# Patient Record
Sex: MALE | Race: WHITE
[De-identification: names, ages, dates, MRNs, and addresses within clinical notes are randomized per-mention and may not be internally consistent; named-entity substitution may affect disease eponyms.]

---

## 2018-03-15 NOTE — EKG
Date Performed: 03/15/2018       Time Performed: 07:33:20

 

PTAGE:      57 years

 

EKG:      Sinus rhythm 

 

 NORMAL ECG 

 

NO PREVIOUS TRACING            

 

DOCTOR:   Misti Albarado  Interpretating Date/Time  03/15/2018 13:53:05

## 2018-03-15 NOTE — HHI.HP
[facility]


Service


Critical Care Medicine


Primary Care Physician


Keenan Tom MD


Admission Diagnosis





Ischemic Colitis


Diagnosis:  


(1) Colitis, acute


Diagnosis:  Principal





(2) Lactic acidosis


Diagnosis:  Secondary





(3) Tobacco abuse


Diagnosis:  Secondary





(4) NELLIE (acute kidney injury)


Diagnosis:  Secondary





(5) Severe sepsis


Diagnosis:  Principal





(6) Opioid dependence


Diagnosis:  Secondary





(7) Chronic pain


Diagnosis:  Secondary





Travel History


International Travel<30 Days:  No


Contact w/Intl Traveler <30 Da:  No


Traveled to Known Affected Are:  No


History of Present Illness


57 year-old  male who states he has had 3 days of left lower quadrant 

abdominal pain.  He has had some nausea and says he has had 3 episodes of 

vomiting, nonbloody nonbilious, over the course of the last 3 days.  He has 

also had 2-3 loose bowel movements over the course of the 3 days.  His friend, 

Janel, tells me that she found him in the house listless in bed with large 

amounts of dark stool on the floor of the bathroom.  He denies fever or chills.

  Workup in the ED demonstrated white count 28,000 with 20% bands.  Lactic acid 

is 6.5.  CT abdomen and pelvis demonstrates colitis  distal transverse colon to 

distal sigmoid, ischemic v.  infectious   He is normotensive with normal heart 

rate.  Patient is tender in the left lower quadrant.  He feels like his pain is 

slightly improved today compared to yesterday.  He states he has been eating 

intermittently throughout the course of this and has not noted increased pain 

with by mouth intake.  Denies history of atrial fibrillation though he is on 

Cardizem.  He attributes his symptoms to opioid withdrawal because he ran out 

of his oxycodone a few days ago and states he has been taking them for over 15 

years





Review of Systems


ROS Limitations:  Clinical Condition





Past Family Social History


Allergies:  


Coded Allergies:  


     codeine (Unverified  Allergy, Severe, 3/15/18)


Past Medical History


HTN


Insomnia


Chronic pain





He denies prior history of atrial fibrillation though he has previously been on 

Cardizem.


Past Surgical History


Right hip arthroplasty 2002


Left hip arthroplasty 2003


Reported Medications


Patient does not know the doses of his medications but states he is on:





Oxycodone


Amitriptyline


Unknown antihypertensive


Family History


Father is living at age 89


Brother had an ocular tumor that was metastatic and  at age 43


Mother  of an unknown cancer in her late 70s


Social History


Homosexual. His partner is  from HIV. Patient states he had HIV test 4 

years ago after partner  and was negative and has subsequently not been 

sexually active. 


No kids. Father is living at age 89. Mother is .


Smokes 1 ppd since 5th grade


Used to binge drink heavily, never a daily drinker, quit drinking in 


No illicit drug use.





Physical Exam


Vital Signs





Vital Signs








  Date Time  Temp Pulse Resp B/P (MAP) Pulse Ox O2 Delivery O2 Flow Rate FiO2


 


3/15/18 04:58     100 Room Air  


 


3/15/18 00:11   18     


 


3/15/18 00:09 97.6 110 18 131/74 (93) 98   








Physical Exam


GENERAL: Well-nourished, well-developed patient who is sitting up in ED bed, 

alert pleasant


SKIN: Warm and dry.


HEAD: Atraumatic. Normocephalic. 


EYES: Pupils equal and round. No scleral icterus. No injection or drainage. 


ENT: No nasal bleeding or discharge.  Mucous membranes pink and moist.


NECK: Trachea midline. No JVD. 


CARDIOVASCULAR: Regular rate and rhythm. No murmurs rubs or gallops. 


RESPIRATORY: No accessory muscle use. Clear to auscultation. Breath sounds 

equal bilaterally.  On room air


GASTROINTESTINAL: Abdomen is overall soft, mildly distended, tender in the left 

lower quadrant.  He does indicate there is some rebound discomfort but overall 

does not appear to have Peritoneal signs.


MUSCULOSKELETAL: Extremities without clubbing, cyanosis, or edema.


NEUROLOGICAL: Awake and alert. No obvious cranial nerve deficits.  Motor 

grossly within normal limits. Normal speech.


Laboratory





Laboratory Tests








Test


  3/15/18


00:18 3/15/18


01:31 3/15/18


04:44 3/15/18


04:54


 


White Blood Count 28.6    


 


Red Blood Count 5.26    


 


Hemoglobin 16.3    


 


Hematocrit 49.5    


 


Mean Corpuscular Volume 94.2    


 


Mean Corpuscular Hemoglobin 31.1    


 


Mean Corpuscular Hemoglobin


Concent 33.0 


  


  


  


 


 


Red Cell Distribution Width 14.1    


 


Platelet Count 226    


 


Mean Platelet Volume 8.5    


 


Neutrophils (%) (Auto) 84.8    


 


Lymphocytes (%) (Auto) 4.5    


 


Monocytes (%) (Auto) 10.4    


 


Eosinophils (%) (Auto) 0.1    


 


Basophils (%) (Auto) 0.2    


 


Neutrophils # (Auto) 24.2    


 


Lymphocytes # (Auto) 1.3    


 


Monocytes # (Auto) 3.0    


 


Eosinophils # (Auto) 0.0    


 


Basophils # (Auto) 0.1    


 


CBC Comment AUTO DIFF    


 


Differential Total Cells


Counted 100 


  


  


  


 


 


Neutrophils % (Manual) 49    


 


Band Neutrophils % 20    


 


Lymphocytes % 8    


 


Monocytes % 11    


 


Neutrophils # (Manual) 23.2    


 


Metamyelocytes 12    


 


Differential Comment


  FINAL DIFF


MANUAL 


  


  


 


 


Atypical Lymphocytes     


 


Toxic Vacuolation PRESENT    


 


Platelet Estimate NORMAL    


 


Platelet Morphology Comment NORMAL    


 


Red Cell Morphology Comment NORMAL    


 


Blood Urea Nitrogen 31    


 


Creatinine 2.15    


 


Random Glucose 102    


 


Total Protein 7.5    


 


Albumin 3.2    


 


Calcium Level 8.9    


 


Alkaline Phosphatase 69    


 


Aspartate Amino Transf


(AST/SGOT) 31 


  


  


  


 


 


Alanine Aminotransferase


(ALT/SGPT) 16 


  


  


  


 


 


Total Bilirubin 0.8    


 


Sodium Level 140    


 


Potassium Level 4.5    


 


Chloride Level 102    


 


Carbon Dioxide Level 23.5    


 


Anion Gap 15    


 


Estimat Glomerular Filtration


Rate 32 


  


  


  


 


 


Lipase 56  54   


 


Lactic Acid Level  6.5   














 Date/Time


Source Procedure


Growth Status


 


 


 3/15/18 02:40


Blood Peripheral Aerobic Blood Culture


Pending Received


 


 3/15/18 02:40


Blood Peripheral Anaerobic Blood Culture


Pending Received








Result Diagram:  


3/15/18 0018                                                                   

             3/15/18 0018








Septic Shock Reassessment


Septic shock perfusion:  reassessment completed





Caprini VTE Risk Assessment


Caprini VTE Risk Assessment:  Mod/High Risk (score >= 2)


VTE Pharm Contraindication:  Documented


Caprini Risk Assessment Model











 Point Value = 1          Point Value = 2  Point Value = 3  Point Value = 5


 


Age 41-60


Minor surgery


BMI > 25 kg/m2


Swollen legs


Varicose veins


Pregnancy or postpartum


History of unexplained or recurrent


   spontaneous 


Oral contraceptives or hormone


   replacement


Sepsis (< 1 month)


Serious lung disease, including


   pneumonia (< 1 month)


Abnormal pulmonary function


Acute myocardial infarction


Congestive heart failure (< 1 month)


History of inflammatory bowel disease


Medical patient at bed rest Age 61-74


Arthroscopic surgery


Major open surgery (> 45 min)


Laparoscopic surgery (> 45 min)


Malignancy


Confined to bed (> 72 hours)


Immobilizing plaster cast


Central venous access Age >= 75


History of VTE


Family history of VTE


Factor V Leiden


Prothrombin 51992D


Lupus anticoagulant


Anticardiolipin antibodies


Elevated serum homocysteine


Heparin-induced thrombocytopenia


Other congenital or acquired


   thrombophilia Stroke (< 1 month)


Elective arthroplasty


Hip, pelvis, or leg fracture


Acute spinal cord injury (< 1 month)








Prophylaxis Regimen











   Total Risk


Factor Score Risk Level Prophylaxis Regimen


 


0-1      Low Early ambulation


 


2 Moderate Order ONE of the following:


*Sequential Compression Device (SCD)


*Heparin 5000 units SQ BID


 


3-4 Higher Order ONE of the following medications:


*Heparin 5000 units SQ TID


*Enoxaparin/Lovenox 40 mg SQ daily (WT < 150 kg, CrCl > 30 mL/min)


*Enoxaparin/Lovenox 30 mg SQ daily (WT < 150 kg, CrCl > 10-29 mL/min)


*Enoxaparin/Lovenox 30 mg SQ BID (WT < 150 kg, CrCl > 30 mL/min)


AND/OR


*Sequential Compression Device (SCD)


 


5 or more Highest Order ONE of the following medications:


*Heparin 5000 units SQ TID (Preferred with Epidurals)


*Enoxaparin/Lovenox 40 mg SQ daily (WT < 150 kg, CrCl > 30 mL/min)


*Enoxaparin/Lovenox 30 mg SQ daily (WT < 150 kg, CrCl > 10-29 mL/min)


*Enoxaparin/Lovenox 30 mg SQ BID (WT < 150 kg, CrCl > 30 mL/min)


AND


*Sequential Compression Device (SCD)











Assessment and Plan


Problem List:  


(1) Severe sepsis


ICD Code:  A41.9 - Sepsis, unspecified organism; R65.20 - Severe sepsis without 

septic shock


Status:  Acute


(2) Colitis, acute


ICD Code:  K52.9 - Noninfective gastroenteritis and colitis, unspecified; 

R65.20 - Severe sepsis without septic shock


Status:  Acute


(3) Lactic acidosis


ICD Code:  E87.2 - Acidosis


Status:  Acute


(4) NELLIE (acute kidney injury)


ICD Code:  N17.9 - Acute kidney failure, unspecified


Status:  Acute


(5) Tobacco abuse


ICD Code:  Z72.0 - Tobacco use


Status:  Chronic


(6) Opioid dependence


ICD Code:  F11.20 - Opioid dependence, uncomplicated


Status:  Chronic


(7) Chronic pain


ICD Code:  G89.29 - Other chronic pain


Status:  Chronic


Assessment and Plan


NEURO:


Chronic pain


Insomnia


Oxycodone as needed for pain.  Dilaudid as needed for breakthrough pain.





RESP:


Tobacco abuse


On room air.  Albuterol every 2 hours as needed for wheezing





CV: 


Check EKG.  Monitor telemetry


Prior medical records states he is on Cardizem.  Unclear patient was still 

taking this.  He states he is on an unknown antihypertensive medication.





GI:


Abdominal pain with ? ischemic colitis


 Vomiting and diarrhea


Nothing by mouth.


Dr. Michelle has discussed with Dr. Blanco who will evaluate patient and 

possible ex lap.


He appears to be clinically improving with resuscitation so may be able to hold 

off for now. GI consulted for input and possible colonoscopy.  Monitor serial 

abdominal exams and lactic acid. 


obtain C diff. 





FEN/RENAL: 


Acute kidney injury


Received 1 L NS in ED.  ml/hr. 


Insert dawson and monitor I/O closely. F/u BMP





ID:


Severe Sepsis 


Colitis - ?Ischemic


Leukocytosis


Cultures have been sent.  We'll send urine culture.  Receive Zosyn and 

vancomycin in the emergency department.  We'll continue vancomycin, cefepime, 

Flagyl, Diflucan.  (dose adjusted for creatinine clearance 28)


Serial lactic acid and serial abdominal exams.  


General surgery and gastroenterology consults





Patient consents to HIV testing, which I have ordered. 





HEME: Monitor CBC





ENDO: Euglycemic





PROPH: SCDs for DVT prophylaxis. Held pharmacologic DVT prophylaxis pending 

surgery/GI eval for possible procedure.  Famotidine IV for stress ulcer 

prophylaxis





ACCESS: Peripheral IV providing adequate access at this time.





Patient updated at bedside.  His friend, Janel Kwong, (patient's male 

partner is  and Janel is his partner's sister) was updated with his 

permission. She encouraged him to come to hospital after he refused several 

times. She can be reached at [phone]. 





Level 3 H&P











Kristi Randle MD Mar 15, 2018 05:13

## 2018-03-15 NOTE — PD.CONS
HPI


History of Present Illness


This is a 57 year old male with chronic back pain who presented for lower abd 

pain, diarrhea, n/v.  ONset 3 days ago.  He denies blood in emesis but per 

another provider he did report this.  He admits red blood in his stool and 

seeing dark stool. He admits taking NSAIDs frequently up until 5 months ago 

when he started noticing epigastric pain.  He admits prior episode of this 1 

year ago and was told by his PCP to get a colonoscopy but he has not done so.  

He thinks he had an EGD in [place] but can recall no further details. 


 (Candice Gustafson)





PFSH


Past Medical History


DDD


neuropathy


HTN


chronic pain


Past Surgical History


bilat hip repalcements


 (Candice Gustafson)


Coded Allergies:  


     codeine (Unverified  Allergy, Severe, 3/15/18)


Family History


ocular tumor - brother


Social History


former drinker quit 2003


smokes 1ppd


denies illicit drug use


 (Candice Gustafson)





Review of Systems


Constitutional:  COMPLAINS OF: Fatigue, DENIES: Fever


Endocrine:  DENIES: Polydipsia


Eyes:  DENIES: Blurred vision


Ears, nose, mouth, throat:  DENIES: Hearing loss


Respiratory:  DENIES: Cough


Cardiovascular:  DENIES: Chest pain


Gastrointestinal:  COMPLAINS OF: Abdominal pain, Black stools, Bloody stools, 

Diarrhea, Nausea, Vomiting


Genitourinary:  DENIES: Hematuria


Musculoskeletal:  DENIES: Joint Swelling


Integumentary:  DENIES: Rash


Hematologic/lymphatic:  DENIES: Bruising


Immunologic/allergic:  DENIES: Eczema


Neurologic:  DENIES: Abnormal gait


Psychiatric:  DENIES: Confusion (Candice Gustafson)





GI Exam


Vitals I&O





Vital Signs








  Date Time  Temp Pulse Resp B/P (MAP) Pulse Ox O2 Delivery O2 Flow Rate FiO2


 


3/15/18 08:10 98.8 99 14 134/86 (102) 93   


 


3/15/18 08:10  99      


 


3/15/18 07:51     (104)    


 


3/15/18 07:35   16     


 


3/15/18 07:35 97.8 98 16 156/79 (104) 98 Room Air  


 


3/15/18 07:35     98 Room Air  


 


3/15/18 07:34   17  98 Room Air  


 


3/15/18 07:03  78 18 148/78 (101) 100   


 


3/15/18 07:00   16     


 


3/15/18 04:58     100 Room Air  


 


3/15/18 00:11   18     


 


3/15/18 00:09 97.6 110 18 131/74 (93) 98   














I/O      


 


 3/14/18 3/14/18 3/14/18 3/15/18 3/15/18 3/15/18





 07:00 15:00 23:00 07:00 15:00 23:00


 


Intake Total    2562.5 ml 100 ml 


 


Output Total     300 ml 


 


Balance    2562.5 ml -200 ml 


 


      


 


Intake IV Total    2562.5 ml 100 ml 


 


Output Urine Total     300 ml 


 


# Voids     0 


 


# Bowel Movements     0 








Imaging





Last Impressions








Abdomen/Pelvis CT 3/15/18 0129 Signed





Impressions: 





 Service Date/Time:  Thursday, March 15, 2018 02:14 - CONCLUSION:  1. Acute 





 inflammatory process extending from the distal transverse colon to the distal 





 sigmoid colon. No significant diverticuli. This could relate to ischemia 

colitis 





 or infectious colitis. 2. No abscess or free air. 3. Well-distended 

gallbladder 





 without calcified gallstones, gallbladder wall thickening, or pericholecystic 





 fluid.     Wil Blue Jr., MD 








Laboratory











Test


  3/15/18


00:18 3/15/18


01:31 3/15/18


04:44 3/15/18


04:54


 


White Blood Count 28.6 TH/MM3    


 


Red Blood Count 5.26 MIL/MM3    


 


Hemoglobin 16.3 GM/DL    


 


Hematocrit 49.5 %    


 


Mean Corpuscular Volume 94.2 FL    


 


Mean Corpuscular Hemoglobin 31.1 PG    


 


Mean Corpuscular Hemoglobin


Concent 33.0 % 


  


  


  


 


 


Red Cell Distribution Width 14.1 %    


 


Platelet Count 226 TH/MM3    


 


Mean Platelet Volume 8.5 FL    


 


Neutrophils (%) (Auto) 84.8 %    


 


Lymphocytes (%) (Auto) 4.5 %    


 


Monocytes (%) (Auto) 10.4 %    


 


Eosinophils (%) (Auto) 0.1 %    


 


Basophils (%) (Auto) 0.2 %    


 


Neutrophils # (Auto) 24.2 TH/MM3    


 


Lymphocytes # (Auto) 1.3 TH/MM3    


 


Monocytes # (Auto) 3.0 TH/MM3    


 


Eosinophils # (Auto) 0.0 TH/MM3    


 


Basophils # (Auto) 0.1 TH/MM3    


 


CBC Comment AUTO DIFF    


 


Differential Total Cells


Counted 100 


  


  


  


 


 


Neutrophils % (Manual) 49 %    


 


Band Neutrophils % 20 %    


 


Lymphocytes % 8 %    


 


Monocytes % 11 %    


 


Neutrophils # (Manual) 23.2 TH/MM3    


 


Metamyelocytes 12 %    


 


Differential Comment


  FINAL DIFF


MANUAL 


  


  


 


 


Atypical Lymphocytes  %    


 


Toxic Vacuolation PRESENT    


 


Platelet Estimate NORMAL    


 


Platelet Morphology Comment NORMAL    


 


Red Cell Morphology Comment NORMAL    


 


Blood Urea Nitrogen 31 MG/DL    


 


Creatinine 2.15 MG/DL    


 


Random Glucose 102 MG/DL    


 


Total Protein 7.5 GM/DL    


 


Albumin 3.2 GM/DL    


 


Calcium Level 8.9 MG/DL    


 


Alkaline Phosphatase 69 U/L    


 


Aspartate Amino Transf


(AST/SGOT) 31 U/L 


  


  


  


 


 


Alanine Aminotransferase


(ALT/SGPT) 16 U/L 


  


  


  


 


 


Total Bilirubin 0.8 MG/DL    


 


Sodium Level 140 MEQ/L    


 


Potassium Level 4.5 MEQ/L    


 


Chloride Level 102 MEQ/L    


 


Carbon Dioxide Level 23.5 MEQ/L    


 


Anion Gap 15 MEQ/L    


 


Estimat Glomerular Filtration


Rate 32 ML/MIN 


  


  


  


 


 


Lipase 56 U/L  54 U/L   


 


Lactic Acid Level  6.5 mmol/L  3.2 mmol/L  


 


Prothrombin Time    12.5 SEC 


 


Prothromb Time International


Ratio 


  


  


  1.2 RATIO 


 


 


Activated Partial


Thromboplast Time 


  


  


  31.0 SEC 


 


 


Test


  3/15/18


06:15 3/15/18


09:32 


  


 


 


Urine Color YELLOW    


 


Urine Turbidity CLEAR    


 


Urine pH 6.0    


 


Urine Specific Gravity 1.020    


 


Urine Protein TRACE mg/dL    


 


Urine Glucose (UA) NEG mg/dL    


 


Urine Ketones NEG mg/dL    


 


Urine Occult Blood NEG    


 


Urine Nitrite NEG    


 


Urine Bilirubin NEG    


 


Urine Urobilinogen 2.0 MG/DL    


 


Urine Leukocyte Esterase TRACE    


 


Urine RBC 3 /hpf    


 


Urine WBC 1 /hpf    


 


Urine Squamous Epithelial


Cells 2 /hpf 


  


  


  


 


 


Urine Bacteria RARE /hpf    


 


Microscopic Urinalysis Comment


  CULT NOT


INDICATED 


  


  


 


 


Blood Urea Nitrogen  31 MG/DL   


 


Creatinine  1.14 MG/DL   


 


Random Glucose  88 MG/DL   


 


Calcium Level  7.7 MG/DL   


 


Sodium Level  141 MEQ/L   


 


Potassium Level  4.5 MEQ/L   


 


Chloride Level  110 MEQ/L   


 


Carbon Dioxide Level  24.3 MEQ/L   


 


Anion Gap  7 MEQ/L   


 


Estimat Glomerular Filtration


Rate 


  66 ML/MIN 


  


  


 


 


Lactic Acid Level  2.3 mmol/L   














 Date/Time


Source Procedure


Growth Status


 


 


 3/15/18 02:40


Blood Peripheral Aerobic Blood Culture


Pending Received


 


 3/15/18 02:40


Blood Peripheral Anaerobic Blood Culture


Pending Received








Physical Examination


HEENT: PERRL; normocephalic; atraumatic; no jaundice.  


CHEST:  CTA


CARDIAC:  tachy


ABDOMEN:  Soft, distended, diffuse TTP; no hepatosplenomegaly; bowel sounds are 

present in all four quadrants.


EXTREMITIES: No clubbing, cyanosis, or edema.


SKIN:  Normal; no rash; no jaundice.


CNS:  alert


 (Candice Gustafson)





Assessment and Plan


Plan


ASSESSMENT


- n/v, abd pain, diarrhea, questionable blood in stool - onset 3 days ago.  

acute colitis seen on CT, 


   infectious vs ischemic. HH WNL at this time.  distended and tender but does 

not appear to have acute abd at this time


   thinks he saw blood in his stool and some "dark" stool.  use to take NSAIDS 

frequently but quit few months ago d/t stomach pain


- leukocytosis - wbc 28.6   on cefepime, flagyl, diflucan





PLAN


- d/w surgery, if pt shows signs worsening infection GS may take to OR 


- clear liquids


- await c diff


- check stool studies


- await hemoccult


- further recs to follow


pt seen by myself and DR Whitley and this note is on his behalf


 (Candice Gustafson)


Physician Comments


As above, features suggestive of ischemic colitis.


Clinically improving , will hold off any endoscopic evaluation and treat 

conservatively.


Will follow up with you.


 (Kristian Whitley MD)











Candice Gustafson Mar 15, 2018 11:06


Kristian Whitley MD Mar 15, 2018 13:19

## 2018-03-15 NOTE — RADRPT
EXAM DATE/TIME:  03/15/2018 02:14 

 

[facility] COMPARISON:     

No previous studies available for comparison.

 

 

INDICATIONS :     

Abdomen pain.

                  

 

ORAL CONTRAST:      

No oral contrast ingested.

                  

 

RADIATION DOSE:     

6.94 CTDIvol (mGy) 

 

 

MEDICAL HISTORY :     

Hypertension.  

 

SURGICAL HISTORY :       

Bilateral hips.

 

ENCOUNTER:      

Initial

 

ACUITY:      

1 day

 

PAIN SCALE:      

5/10

 

LOCATION:       

Bilateral  abdomen

 

TECHNIQUE:     

Volumetric scanning of the abdomen and pelvis was performed.  Using automated exposure control and ad
justment of the mA and/or kV according to patient size, radiation dose was kept as low as reasonably 
achievable to obtain optimal diagnostic quality images.  DICOM format image data is available electro
nically for review and comparison.  

 

FINDINGS:     

 

LOWER LUNGS:     

Emphysematous changes within the visualized lung bases.

 

LIVER:     

Homogeneous density without lesion.  There is no dilation of the biliary tree.  No calcified gallston
es. The gallbladder is well-distended without wall thickening or pericholecystic fluid.

 

SPLEEN:     

Normal size without lesion.

 

PANCREAS:     

Within normal limits. 

 

KIDNEYS:     

Normal in size and shape.  There is no mass, stone, or hydronephrosis.

 

ADRENAL GLANDS:     

Within normal limits.

 

VASCULAR:     

Diffuse calcified atherosclerotic plaque without aneurysmal change.

 

BOWEL/MESENTERY:     

Wall thickening is seen extending from the distal transverse colon to the level of the distal sigmoid
 colon. This is smooth wall thickening. Mild stranding of the adjacent pericolonic fat. Small amount 
of free fluid within the left paracolic gutter. No obstruction. No free air. No abscess. Remaining colton
wel structures are unremarkable.

 

ABDOMINAL WALL:     

Within normal limits.

 

RETROPERITONEUM:     

There is no lymphadenopathy.

 

BLADDER:     

No wall thickening or mass.

 

REPRODUCTIVE:     

Within normal limits.

 

INGUINAL:     

There is no lymphadenopathy or hernia.

 

MUSCULOSKELETAL:     

Within normal limits for patient age. Bilateral hip implants.

 

CONCLUSION:     

1. Acute inflammatory process extending from the distal transverse colon to the distal sigmoid colon.
 No significant diverticuli. This could relate to ischemia colitis or infectious colitis.

2. No abscess or free air.

3. Well-distended gallbladder without calcified gallstones, gallbladder wall thickening, or perichole
cystic fluid.

 

 

 

 Wil Blue Jr., MD on March 15, 2018 at 2:32           

Board Certified Radiologist.

 This report was verified electronically.

## 2018-03-15 NOTE — HHI.CCPN
Subjective


Remarks/Hospital Course


57 year-old  male who states he has had 3 days of left lower quadrant 

abdominal pain.  He has had some nausea and says he has had 3 episodes of 

vomiting, nonbloody nonbilious, over the course of the last 3 days.  He has 

also had 2-3 loose bowel movements over the course of the 3 days.  His friend, 

Janel, tells me that she found him in the house listless in bed with large 

amounts of dark stool on the floor of the bathroom.  He denies fever or chills.

  Workup in the ED demonstrated white count 28,000 with 20% bands.  Lactic acid 

is 6.5.  CT abdomen and pelvis demonstrates colitis  distal transverse colon to 

distal sigmoid, ischemic v.  infectious   He is normotensive with normal heart 

rate.  Patient is tender in the left lower quadrant.  He feels like his pain is 

slightly improved today compared to yesterday.  He states he has been eating 

intermittently throughout the course of this and has not noted increased pain 

with by mouth intake.  Denies history of atrial fibrillation though he is on 

Cardizem.  He attributes his symptoms to opioid withdrawal because he ran out 

of his oxycodone a few days ago and states he has been taking them for over 15 

years





03/15 1800 hours: Urine output finally picking up but remains marginal. Lactic 

acidosis worse. Tenderness persists LLQ. Discussed with Dr. Blanco; probably 

requires exploration and will proceed.





Objective





Vital Signs








  Date Time  Temp Pulse Resp B/P (MAP) Pulse Ox O2 Delivery O2 Flow Rate FiO2


 


3/15/18 16:00  98      


 


3/15/18 12:00 98.6  20 106/70 (82) 97   


 


3/15/18 08:00      Nasal Cannula 2.00 














Intake and Output   


 


 3/15/18 3/15/18 3/16/18





 08:00 16:00 00:00


 


Intake Total 2662.5 ml  


 


Output Total 300 ml  


 


Balance 2362.5 ml  








Result Diagram:  


3/15/18 1158                                                                   

             3/15/18 0932





Objective Remarks


GENERAL: Ill-appearing man.


SKIN: Warm and dry.


HEAD: Atraumatic. Normocephalic. 


EYES: Pupils equal and round. No scleral icterus. No injection or drainage. 


ENT: No nasal bleeding or discharge. Mucous membranes pink and moist.


NECK: Trachea midline. Airway unobstructed.


CARDIOVASCULAR: Regular rate and rhythm. No murmurs rubs or gallops. Neck veins 

flat.


RESPIRATORY:  Clear to auscultation. Breath sounds equal bilaterally. 


GASTROINTESTINAL: Abdomen is overall soft, mildly distended, remains tender in 

the left lower quadrant. Quiet.


MUSCULOSKELETAL: Extremities without clubbing, cyanosis, or edema. Well perfused

, flushed.


NEUROLOGICAL: Confused. No obvious cranial nerve deficits.  Motor grossly 

within normal limits. Follows commands.





A/P


Problem List:  


(1) Severe sepsis


ICD Code:  A41.9 - Sepsis, unspecified organism; R65.20 - Severe sepsis without 

septic shock


Status:  Acute


(2) Colitis, acute


ICD Code:  K52.9 - Noninfective gastroenteritis and colitis, unspecified; 

R65.20 - Severe sepsis without septic shock


Status:  Acute


(3) Lactic acidosis


ICD Code:  E87.2 - Acidosis


Status:  Acute


(4) NELLIE (acute kidney injury)


ICD Code:  N17.9 - Acute kidney failure, unspecified


(5) Tobacco abuse


ICD Code:  Z72.0 - Tobacco use


Status:  Chronic


Assessment and Plan


NEURO:


Chronic pain


Insomnia


Oxycodone as needed for pain.  Dilaudid as needed for breakthrough pain.





RESP:


Tobacco abuse


On room air.  Albuterol every 2 hours as needed for wheezing





CV: 


Check EKG.  Monitor telemetry


Prior medical records states he is on Cardizem.  Unclear patient was still 

taking this.  He states he is on an unknown antihypertensive medication.





GI:


Abdominal pain with ? ischemic colitis


 Vomiting and diarrhea


Nothing by mouth.


Dr. Michelle has discussed with Dr. Blanco who will evaluate patient and 

possible ex lap.


He appears to be clinically improving with resuscitation so may be able to hold 

off for now. GI consulted for input and possible colonoscopy.  Monitor serial 

abdominal exams and lactic acid. 


obtain C diff. .


Plan laparoscopic exploration





FEN/RENAL: 


Acute kidney injury


Received 1 L NS in ED.  ml/hr. 


Maintain dawson and monitor I/O closely. F/u BMP





ID:


Severe Sepsis 


Colitis - ?Ischemic


Leukocytosis


Cultures have been sent.  We'll send urine culture.  Receive Zosyn and 

vancomycin in the emergency department.  We'll continue vancomycin, cefepime, 

Flagyl, Diflucan.  (dose adjusted for creatinine clearance 28)


Serial lactic acid and serial abdominal exams.  


General surgery and gastroenterology consults





Patient consents to HIV testing, which I have ordered. 





HEME: Monitor CBC





ENDO: Euglycemic





PROPH: SCDs for DVT prophylaxis.  Famotidine IV for stress ulcer prophylaxis





ACCESS: Peripheral IV providing adequate access at this time.





Overall impression: Patient has become critically ill with deteriorating 

physical exam and worsening lactic acidosis. Will require surgery.





Critical Care 50 mins











Manuel Calhoun MD Mar 15, 2018 18:14

## 2018-03-15 NOTE — PD
HPI


Chief Complaint:  Abdominal Pain


Time Seen by Provider:  00:38


Travel History


International Travel<30 days:  No


Contact w/Intl Traveler<30days:  No


Traveled to known affect area:  No





History of Present Illness


HPI


79-year-old male arrives here complaining of abdominal pain in the left 

abdomen.  It started in the morning about 12 hours prior to ER arrival.  

Patient reported multiple episodes of vomiting as well as black diarrhea.  He 

reports pain 9/10.  Additional complaints include nausea.  He denies fever.  He 

reports typically takes oxycodone in the morning however didn't today because 

he confronted pill bottle.  He believes there might have been a tiny amount of 

blood in the emesis today.  Pain is constant and worse with palpation.





PFSH


Past Medical History


Hypertension:  Yes


Tetanus Vaccination:  < 5 Years


Influenza Vaccination:  No





Past Surgical History


Appendectomy:  Yes


Joint Replacement:  Yes (BILATERAL HIPS.)


Tonsillectomy:  Yes





Social History


Alcohol Use:  No


Tobacco Use:  Yes (ONE PACK PER DAY)


Substance Use:  No





Allergies-Medications


(Allergen,Severity, Reaction):  


Coded Allergies:  


     codeine (Unverified  Allergy, Severe, 3/15/18)


Reported Meds & Prescriptions





Reported Meds & Active Scripts


Active


Reported


Lyrica (Pregabalin) 75 Mg Cap 75 Mg PO BID


Diltiazem ER 12 HR (Diltiazem HCl) 60 Mg Caper Unknown Dose PO BID


Roxicodone (Oxycodone HCl) 15 Mg Tab 15 Mg PO Q4H PRN








Review of Systems


Except as stated in HPI:  all other systems reviewed are Neg


General / Constitutional:  No: Fever





Physical Exam


Narrative


GENERAL: 57-year-old male well-nourished well-developed mild to moderate 

distress secondary to pain





Vital Signs








  Date Time  Temp Pulse Resp B/P (MAP) Pulse Ox O2 Delivery O2 Flow Rate FiO2


 


3/15/18 00:11   18     


 


3/15/18 00:09 97.6 110 18 131/74 (93) 98   








SKIN: Warm and dry.


HEAD: Atraumatic. Normocephalic. 


EYES: Pupils equal and round. No scleral icterus. No injection or drainage. 


ENT: No nasal bleeding or discharge.  Mucous membranes pink and moist.


NECK: Trachea midline. No JVD. 


CARDIOVASCULAR: Tachycardia.  Regular rhythm.


RESPIRATORY: No accessory muscle use. Clear to auscultation. Breath sounds 

equal bilaterally. 


GASTROINTESTINAL: Generalized abdominal tenderness is present.  There is 

guarding.  Abdomen is peritoneal.


MUSCULOSKELETAL: Extremities without clubbing, cyanosis, or edema. No obvious 

deformities. 


NEUROLOGICAL: Awake and alert. No obvious cranial nerve deficits.  Motor 

grossly within normal limits. Five out of 5 muscle strength in the arms and 

legs.  Normal speech.


PSYCHIATRIC: Appropriate mood and affect; insight and judgment normal.





Data


Data


Last Documented VS





Vital Signs








  Date Time  Temp Pulse Resp B/P (MAP) Pulse Ox O2 Delivery O2 Flow Rate FiO2


 


3/15/18 00:11   18     


 


3/15/18 00:09 97.6 110  131/74 (93) 98   








Orders





 Orders


Complete Blood Count With Diff (3/15/18 00:14)


Comprehensive Metabolic Panel (3/15/18 00:14)


Urinalysis - C+S If Indicated (3/15/18 00:14)


Iv Access Insert/Monitor (3/15/18 00:14)


Oxygen Administration (3/15/18 00:14)


Oximetry (3/15/18 00:14)


Lipase (3/15/18 00:14)


Oxycodone-Acetamin  Mg (Percocet 1 (3/15/18 00:45)


Lipase (3/15/18 01:29)


Lactic Acid (3/15/18 01:29)


Ct Abd/Pel W/O Iv Contrast (3/15/18 01:29)


Ondansetron Inj (Zofran Inj) (3/15/18 01:30)


Sodium Chlor 0.9% 1000 Ml Inj (Ns 1000 M (3/15/18 01:29)


Sodium Chloride 0.9% Flush (Ns Flush) (3/15/18 01:30)


Al-Mag Hy-Si 40-40-4 Mg/Ml Liq (Mag-Al P (3/15/18 01:30)


Lidocaine 2% Viscous (Xylocaine 2% Visco (3/15/18 01:30)


Sepsis Workup Initiated (3/15/18 )


Lactic Acid Sepsis Protocol (3/15/18 03:00)


Blood Culture (3/15/18 03:00)


Vancomycin Inj (Vancomycin Inj) (3/15/18 03:00)


Piperacil-Tazo 2.25 Gm Premix (Zosyn 2.2 (3/15/18 03:00)


Sodium Chlor 0.9% 1000 Ml Inj (Ns 1000 M (3/15/18 03:15)


Hydromorphone Pf Inj (Dilaudid Pf Inj) (3/15/18 03:15)


Admit Order (Ed Use Only) (3/15/18 )


Cardiac Monitor / Telemetry [id number] (3/15/18 04:28)


Vital Signs (Adult) Q4H (3/15/18 04:28)


Diet Npo (3/15/18 Breakfast)


Activity Bed Rest (3/15/18 04:28)





Labs





Laboratory Tests








Test


  3/15/18


00:18 3/15/18


01:31


 


White Blood Count 28.6 TH/MM3  


 


Red Blood Count 5.26 MIL/MM3  


 


Hemoglobin 16.3 GM/DL  


 


Hematocrit 49.5 %  


 


Mean Corpuscular Volume 94.2 FL  


 


Mean Corpuscular Hemoglobin 31.1 PG  


 


Mean Corpuscular Hemoglobin


Concent 33.0 % 


  


 


 


Red Cell Distribution Width 14.1 %  


 


Platelet Count 226 TH/MM3  


 


Mean Platelet Volume 8.5 FL  


 


Neutrophils (%) (Auto) 84.8 %  


 


Lymphocytes (%) (Auto) 4.5 %  


 


Monocytes (%) (Auto) 10.4 %  


 


Eosinophils (%) (Auto) 0.1 %  


 


Basophils (%) (Auto) 0.2 %  


 


Neutrophils # (Auto) 24.2 TH/MM3  


 


Lymphocytes # (Auto) 1.3 TH/MM3  


 


Monocytes # (Auto) 3.0 TH/MM3  


 


Eosinophils # (Auto) 0.0 TH/MM3  


 


Basophils # (Auto) 0.1 TH/MM3  


 


CBC Comment AUTO DIFF  


 


Differential Total Cells


Counted 100 


  


 


 


Neutrophils % (Manual) 49 %  


 


Band Neutrophils % 20 %  


 


Lymphocytes % 8 %  


 


Monocytes % 11 %  


 


Neutrophils # (Manual) 23.2 TH/MM3  


 


Metamyelocytes 12 %  


 


Differential Comment


  FINAL DIFF


MANUAL 


 


 


Atypical Lymphocytes  %  


 


Toxic Vacuolation PRESENT  


 


Platelet Estimate NORMAL  


 


Platelet Morphology Comment NORMAL  


 


Red Cell Morphology Comment NORMAL  


 


Blood Urea Nitrogen 31 MG/DL  


 


Creatinine 2.15 MG/DL  


 


Random Glucose 102 MG/DL  


 


Total Protein 7.5 GM/DL  


 


Albumin 3.2 GM/DL  


 


Calcium Level 8.9 MG/DL  


 


Alkaline Phosphatase 69 U/L  


 


Aspartate Amino Transf


(AST/SGOT) 31 U/L 


  


 


 


Alanine Aminotransferase


(ALT/SGPT) 16 U/L 


  


 


 


Total Bilirubin 0.8 MG/DL  


 


Sodium Level 140 MEQ/L  


 


Potassium Level 4.5 MEQ/L  


 


Chloride Level 102 MEQ/L  


 


Carbon Dioxide Level 23.5 MEQ/L  


 


Anion Gap 15 MEQ/L  


 


Estimat Glomerular Filtration


Rate 32 ML/MIN 


  


 


 


Lipase 56 U/L  54 U/L 


 


Lactic Acid Level  6.5 mmol/L 











MDM


Medical Decision Making


Medical Screen Exam Complete:  Yes


Emergency Medical Condition:  Yes


Medical Record Reviewed:  Yes


Differential Diagnosis


Constipation, Gastritis, Acute Cholecystitis, Biliary Colic, Pancreatitis, ALEXANDRA

, Hepatitis, Bowel Obstruction, Cystitis, Mesenteric Ischemia, AAA, Appendicitis

, Renal Stone/Hydronephrosis, GERD, perforated viscous


Narrative Course


CBC & BMP Diagram


3/15/18 00:18








Total Protein 7.5, Albumin 3.2 L, Calcium Level 8.9, Alkaline Phosphatase 69, 

Aspartate Amino Transf (AST/SGOT) 31, Alanine Aminotransferase (ALT/SGPT) 16, 

Total Bilirubin 0.8





Band neutrophilia 20%


Last Impressions








Abdomen/Pelvis CT 3/15/18 0129 Signed





Impressions: 





 Service Date/Time:  Thursday, March 15, 2018 02:14 - CONCLUSION:  1. Acute 





 inflammatory process extending from the distal transverse colon to the distal 





 sigmoid colon. No significant diverticuli. This could relate to ischemia 

colitis 





 or infectious colitis. 2. No abscess or free air. 3. Well-distended 

gallbladder 





 without calcified gallstones, gallbladder wall thickening, or pericholecystic 





 fluid.     Wil Blue Jr., MD 





Patient received Zosyn and vancomycin.  IV fluid resuscitation initiated.  The 

lactic acid 6.5.   I discussed the case with Dr. Blanco of Gen. surgery who is 

aware of the patient and plans for probable operative intervention.  The heart 

rate is about 100 20/1/10.  The blood pressure is about 130/90.  There is 

concern for potentially ischemic or infectious colitis. d/w Dr Randle for 

intensivist service


Critical Care Narrative


Aggregate critical care time was 35 minutes. Time to perform other separately 

billable procedures was not  


included in the critical care time. My time did not include minutes spent 

treating any other patients simultaneously or on  


activities that did not directly contribute to the patient's treatment.  





The services I provided to this patient were to treat and/or prevent clinically 

significant deterioration that could result  


in: Septic shock, cardiopulmonary arrest





I provided critical care services requiring my management, as noted below:


Chart data review, documentation time, medication orders and management, vital 

sign assessments/reviewing monitor data,  


ordering and reviewing lab tests, ordering and interpreting/reviewing x-rays 

and diagnostic studies, care of the patient  


and discussion of the patient with the admitting physicians.





Sepsis Criteria


SIRS Criteria (2 or more):  RR  > 20 or PaCO2 < 32, WBC > 18277, < 4000 or > 10

% bands


Septic Shock Criteria:  Lactic acid >=4





Diagnosis





 Primary Impression:  


 Colitis, acute


 Additional Impression:  


 Severe sepsis





Admitting Information


Admitting Physician Requests:  Admit











Iván Michelle MD Mar 15, 2018 03:13

## 2018-03-15 NOTE — HHI.PR
__________________________________________________





Immediate Post Op Note


Procedure Date:


Mar 15, 2018


Pre Op Diagnosis:  


acute abdomen, lactic acidosis, colonic ischemia


Post Op Diagnosis:  


acute abdomen, ascities


Surgeon:


Diomedes Blanco MD


Assistant(s):


none


Procedure:


dx lap, with washout, drain placement


Findings:


ascities, viable colon


Complications:


none


Specimen(s) removed:


none


Estimated blood loss:


5cc


Anesthesia:  General


Drains:  ANDREI


Patient to:  PACU


Patient Condition:  Fair











Diomedes Blanco MD Mar 15, 2018 18:20

## 2018-03-16 NOTE — HHI.GIFU
Subjective


Remarks


pt resting in bed.  family at bedside.  Says he is having less pain than 

yesterday and has had no loose stool today.  s/p diag lap yesterday for acute 

abd.


 (Candice Gustafson)





Objective


Vitals I&O





Vital Signs








  Date Time  Temp Pulse Resp B/P (MAP) Pulse Ox O2 Delivery O2 Flow Rate FiO2


 


3/16/18 12:00 99.6 102 13 148/72 (97) 96   


 


3/16/18 12:00  98      


 


3/16/18 10:00  97      


 


3/16/18 08:00  98      


 


3/16/18 08:00 98.4 98 14 161/88 (112) 99   


 


3/16/18 07:00     98 Nasal Cannula 3.00 


 


3/16/18 06:00  108      


 


3/16/18 04:00  100      


 


3/16/18 04:00 98.5 99 16 154/75 (101) 100   


 


3/16/18 02:00  100      


 


3/16/18 00:00 98.3 99 12 143/75 (97) 99   


 


3/16/18 00:00  92      


 


3/15/18 22:20     98 Nasal Cannula 4.00 


 


3/15/18 22:00  105      


 


3/15/18 20:00  91      


 


3/15/18 20:00 98.1 96 14 164/81 (108) 98   


 


3/15/18 19:45 98.1 95 13 140/78 (98) 97   


 


3/15/18 19:30  97 14 137/72 (93) 98 Nasal Cannula 3 


 


3/15/18 19:15  100 14 124/65 (84) 99 Nasal Cannula 3 


 


3/15/18 19:00  102 14 116/61 (79) 99 Nasal Cannula 3 


 


3/15/18 19:00     97 Nasal Cannula 2.00 


 


3/15/18 18:45  104 14 119/62 (81) 98 Nasal Cannula 3 


 


3/15/18 18:39 98.2 107 19 119/60 (79) 96 Nasal Cannula 3 


 


3/15/18 16:00  98      














I/O      


 


 3/15/18 3/15/18 3/15/18 3/16/18 3/16/18 3/16/18





 07:00 15:00 23:00 07:00 15:00 23:00


 


Intake Total 2562.5 ml 100 ml 6000 ml 270 ml  


 


Output Total  300 ml 555 ml 1150 ml  


 


Balance 2562.5 ml -200 ml 5445 ml -880 ml  


 


      


 


Intake Oral    70 ml  


 


IV Total 2562.5 ml 100 ml 6000 ml 200 ml  


 


Output Urine Total  300 ml 450 ml 800 ml  


 


Drainage Total   100 ml 350 ml  


 


Estimated Blood Loss   5 ml   


 


# Voids  0    


 


# Bowel Movements  0  1  








Laboratory





Laboratory Tests








Test


  3/15/18


21:45 3/16/18


03:53


 


Lactic Acid Level 2.2  1.8 


 


White Blood Count  11.5 


 


Red Blood Count  3.96 


 


Hemoglobin  12.5 


 


Hematocrit  37.5 


 


Mean Corpuscular Volume  94.7 


 


Mean Corpuscular Hemoglobin  31.5 


 


Mean Corpuscular Hemoglobin


Concent 


  33.3 


 


 


Red Cell Distribution Width  14.8 


 


Platelet Count  125 


 


Mean Platelet Volume  8.4 


 


CBC Comment  AUTO DIFF 


 


Differential Total Cells


Counted 


  100 


 


 


Neutrophils % (Manual)  46 


 


Band Neutrophils %  28 


 


Lymphocytes %  9 


 


Monocytes %  4 


 


Neutrophils # (Manual)  10.0 


 


Metamyelocytes  13 


 


Differential Comment


  


  FINAL DIFF


MANUAL


 


Toxic Vacuolation  PRESENT 


 


Platelet Estimate  LOW 


 


Platelet Morphology Comment  NORMAL 


 


Prothrombin Time  12.4 


 


Prothromb Time International


Ratio 


  1.2 


 


 


Blood Urea Nitrogen  19 


 


Creatinine  0.93 


 


Random Glucose  98 


 


Total Protein  5.4 


 


Albumin  2.2 


 


Calcium Level  7.2 


 


Phosphorus Level  2.0 


 


Magnesium Level  1.9 


 


Alkaline Phosphatase  41 


 


Aspartate Amino Transf


(AST/SGOT) 


  22 


 


 


Alanine Aminotransferase


(ALT/SGPT) 


  11 


 


 


Total Bilirubin  0.3 


 


Sodium Level  140 


 


Potassium Level  4.8 


 


Chloride Level  108 


 


Carbon Dioxide Level  26.1 


 


Anion Gap  6 


 


Estimat Glomerular Filtration


Rate 


  83 


 


 


Protein Corrected Calcium  8.1 


 


Random Vancomycin Level  4.0 














 Date/Time


Source Procedure


Growth Status


 


 


 3/15/18 02:40


Blood Peripheral Aerobic Blood Culture - Preliminary


NO GROWTH IN 1 DAY Resulted


 


 3/15/18 02:40


Blood Peripheral Anaerobic Blood Culture - Preliminary


NO GROWTH IN 1 DAY Resulted





 3/15/18 12:31


Stool Stool Cryptosporidium Exam


Pending Received


 


 3/15/18 12:31


Stool Stool Giardia Antigen (JODI)


Pending Received








Imaging





Last Impressions








Abdomen/Pelvis CT 3/15/18 0129 Signed





Impressions: 





 Service Date/Time:  Thursday, March 15, 2018 02:14 - CONCLUSION:  1. Acute 





 inflammatory process extending from the distal transverse colon to the distal 





 sigmoid colon. No significant diverticuli. This could relate to ischemia 

colitis 





 or infectious colitis. 2. No abscess or free air. 3. Well-distended 

gallbladder 





 without calcified gallstones, gallbladder wall thickening, or pericholecystic 





 fluid.     Wil Blue Jr., MD 








Physical Exam


HEENT:   normocephalic; atraumatic; no jaundice.  


CHEST:  CTA


CARDIAC:  tachy


ABDOMEN:  Soft, nondistended, mild diffuse TTP; no hepatosplenomegaly; bowel 

sounds are present in all four quadrants.


   brown tinged clear fluid in drain


EXTREMITIES: No clubbing, cyanosis, or edema.


SKIN:  Normal; no rash; no jaundice.


CNS:  lethargic


 (Candice Gustafson)





Assessment and Plan


Plan


ASSESSMENT


- n/v, abd pain, diarrhea, questionable blood in stool - onset 3 days ago.  

acute colitis seen on CT, 


   infectious vs ischemic. HH WNL at this time.  distended and tender but does 

not appear to have acute abd at this time


   thinks he saw blood in his stool and some "dark" stool.  use to take NSAIDS 

frequently but quit few months ago d/t stomach pain


- leukocytosis - wbc 28.6   on cefepime, flagyl, diflucan


3/16/18  s/p diag lap and drain placement yesterday, bowel viable.  pt feels 

better today.  c diff neg.  diarrhea improved today.


  stool studies pending. 





PLAN


- diet per GS


- await stool studies


- further recs to follow


- colonoscopy as outpt


pt seen by myself and DR Whitley and this note is on his behalf


 (Candice Gustafson)


Physician Comments


As above, will check stool studies, F/U clinically.


 (Kristian Whitley MD)











Candice Gustafson Mar 16, 2018 14:39


Kristian Whitley MD Mar 16, 2018 14:57

## 2018-03-16 NOTE — HHI.PR
__________________________________________________





Subjective


Subjective Notes


no acute issues, pain better, afebrile, lactate normalized





Objective


Vitals/I&O





Vital Signs








  Date Time  Temp Pulse Resp B/P (MAP) Pulse Ox O2 Delivery O2 Flow Rate FiO2


 


3/16/18 12:00 99.6 102 13 148/72 (97) 96   


 


3/16/18 07:00      Nasal Cannula 3.00 








Labs





Laboratory Tests








Test


  3/15/18


13:59 3/15/18


21:45 3/16/18


03:53


 


Lactic Acid Level 5.1  2.2  1.8 


 


White Blood Count   11.5 


 


Red Blood Count   3.96 


 


Hemoglobin   12.5 


 


Hematocrit   37.5 


 


Mean Corpuscular Volume   94.7 


 


Mean Corpuscular Hemoglobin   31.5 


 


Mean Corpuscular Hemoglobin


Concent 


  


  33.3 


 


 


Red Cell Distribution Width   14.8 


 


Platelet Count   125 


 


Mean Platelet Volume   8.4 


 


CBC Comment   AUTO DIFF 


 


Differential Total Cells


Counted 


  


  100 


 


 


Neutrophils % (Manual)   46 


 


Band Neutrophils %   28 


 


Lymphocytes %   9 


 


Monocytes %   4 


 


Neutrophils # (Manual)   10.0 


 


Metamyelocytes   13 


 


Differential Comment


  


  


  FINAL DIFF


MANUAL


 


Toxic Vacuolation   PRESENT 


 


Platelet Estimate   LOW 


 


Platelet Morphology Comment   NORMAL 


 


Prothrombin Time   12.4 


 


Prothromb Time International


Ratio 


  


  1.2 


 


 


Blood Urea Nitrogen   19 


 


Creatinine   0.93 


 


Random Glucose   98 


 


Total Protein   5.4 


 


Albumin   2.2 


 


Calcium Level   7.2 


 


Phosphorus Level   2.0 


 


Magnesium Level   1.9 


 


Alkaline Phosphatase   41 


 


Aspartate Amino Transf


(AST/SGOT) 


  


  22 


 


 


Alanine Aminotransferase


(ALT/SGPT) 


  


  11 


 


 


Total Bilirubin   0.3 


 


Sodium Level   140 


 


Potassium Level   4.8 


 


Chloride Level   108 


 


Carbon Dioxide Level   26.1 


 


Anion Gap   6 


 


Estimat Glomerular Filtration


Rate 


  


  83 


 


 


Protein Corrected Calcium   8.1 


 


Random Vancomycin Level   4.0 














 Date/Time


Source Procedure


Growth Status


 


 


 3/15/18 02:40


Blood Peripheral Aerobic Blood Culture - Preliminary


NO GROWTH IN 1 DAY Resulted


 


 3/15/18 02:40


Blood Peripheral Anaerobic Blood Culture - Preliminary


NO GROWTH IN 1 DAY Resulted





 3/15/18 12:31


Stool Stool Cryptosporidium Exam


Pending Received


 


 3/15/18 12:31


Stool Stool Giardia Antigen (JODI)


Pending Received








Radiology





Last 48 hours Impressions








Abdomen/Pelvis CT 3/15/18 0129 Signed





Impressions: 





 Service Date/Time:  Thursday, March 15, 2018 02:14 - CONCLUSION:  1. Acute 





 inflammatory process extending from the distal transverse colon to the distal 





 sigmoid colon. No significant diverticuli. This could relate to ischemia 

colitis 





 or infectious colitis. 2. No abscess or free air. 3. Well-distended 

gallbladder 





 without calcified gallstones, gallbladder wall thickening, or pericholecystic 





 fluid.     Wil Blue Jr., MD 








Lungs:  Clear


Abdomen:  Other (soft +ttp diffuse LLQ greatest, adilene serous dark)





A/P


Assessment and Plan


POD 1 dx lap with washout 


PLAN


ABX


Pain control


npo ok for ice chips


trend labs


dvt ppx











Diomedes Blanco MD Mar 16, 2018 14:00

## 2018-03-16 NOTE — HHI.CCPN
Subjective


Remarks/Hospital Course


57 year-old  male who states he has had 3 days of left lower quadrant 

abdominal pain.  He has had some nausea and says he has had 3 episodes of 

vomiting, nonbloody nonbilious, over the course of the last 3 days.  He has 

also had 2-3 loose bowel movements over the course of the 3 days.  His friend, 

Janel, tells me that she found him in the house listless in bed with large 

amounts of dark stool on the floor of the bathroom.  He denies fever or chills.

  Workup in the ED demonstrated white count 28,000 with 20% bands.  Lactic acid 

is 6.5.  CT abdomen and pelvis demonstrates colitis  distal transverse colon to 

distal sigmoid, ischemic v.  infectious   He is normotensive with normal heart 

rate.  Patient is tender in the left lower quadrant.  He feels like his pain is 

slightly improved today compared to yesterday.  He states he has been eating 

intermittently throughout the course of this and has not noted increased pain 

with by mouth intake.  Denies history of atrial fibrillation though he is on 

Cardizem.  He attributes his symptoms to opioid withdrawal because he ran out 

of his oxycodone a few days ago and states he has been taking them for over 15 

years





03/15 1800 hours: Urine output finally picking up but remains marginal. Lactic 

acidosis worse. Tenderness persists LLQ. Discussed with Dr. Blanco; probably 

requires exploration and will proceed.





03/16: Improved general clinical condition today. Lactic acidosis resolved and 

renal function better. Continue antibiotics pending C&S.





Objective





Vital Signs








  Date Time  Temp Pulse Resp B/P (MAP) Pulse Ox O2 Delivery O2 Flow Rate FiO2


 


3/16/18 08:00  98      


 


3/16/18 08:00 98.4  14 161/88 (112) 99   


 


3/16/18 07:00      Nasal Cannula 3.00 














Intake and Output   


 


 3/16/18 3/16/18 3/17/18





 08:00 16:00 00:00


 


Intake Total 270 ml  


 


Output Total 1150 ml  


 


Balance -880 ml  








Result Diagram:  


3/16/18 0353                                                                   

             3/16/18 0353





Objective Remarks


GENERAL: Anxious man.


SKIN: Warm and dry.


HEAD: Atraumatic. Normocephalic. 


EYES: Pupils equal and round. No scleral icterus. No injection or drainage. 


ENT: No nasal bleeding or discharge. Mucous membranes pink and moist.


NECK: Trachea midline. Airway unobstructed.


CARDIOVASCULAR: Regular rate and rhythm. No murmurs rubs or gallops. No JVD.


RESPIRATORY:  Clear to auscultation. Breath sounds equal bilaterally. No 

adventitious sounds.


GASTROINTESTINAL: Abdomen is overall soft, mildly distended, remains moderately 

tender in the left lower quadrant. 


MUSCULOSKELETAL: Extremities without clubbing, cyanosis, or edema. Well perfused

, flushed.


NEUROLOGICAL: Confused. No obvious cranial nerve deficits.  Motor grossly 

within normal limits. Follows commands.





A/P


Problem List:  


(1) Severe sepsis


ICD Code:  A41.9 - Sepsis, unspecified organism; R65.20 - Severe sepsis without 

septic shock


Status:  Acute


(2) Colitis, acute


ICD Code:  K52.9 - Noninfective gastroenteritis and colitis, unspecified; 

R65.20 - Severe sepsis without septic shock


Status:  Acute


(3) Lactic acidosis


ICD Code:  E87.2 - Acidosis


Status:  Acute


(4) NELLIE (acute kidney injury)


ICD Code:  N17.9 - Acute kidney failure, unspecified


(5) Tobacco abuse


ICD Code:  Z72.0 - Tobacco use


Status:  Chronic


Assessment and Plan


NEURO:


Chronic pain


Insomnia


Oxycodone as needed for pain.  Dilaudid as needed for breakthrough pain.





RESP:


Tobacco abuse


On room air.  Albuterol every 2 hours as needed for wheezing





CV: 


Check EKG.  Monitor telemetry


Prior medical records states he is on Cardizem.  Unclear patient was still 

taking this.  He states he is on an unknown antihypertensive medication.





GI:


Abdominal pain with ? ischemic colitis


 Vomiting and diarrhea


Nothing by mouth.


Dr. Michelle has discussed with Dr. Blanco who will evaluate patient and 

possible ex lap.


He appears to be clinically improving with resuscitation so may be able to hold 

off for now. GI consulted for input and possible colonoscopy.  Monitor serial 

abdominal exams and lactic acid. 


obtain C diff. .


Plan laparoscopic exploration -> tea colored fluid, colon viable.





FEN/RENAL: 


Acute kidney injury


Received 1 L NS in ED.  ml/hr. 


Maintain dawson and monitor I/O closely. F/u BMP





ID:


Severe Sepsis 


Colitis - ?Ischemic


Leukocytosis


Cultures have been sent.  We'll send urine culture.  Receive Zosyn and 

vancomycin in the emergency department.  We'll continue vancomycin, cefepime, 

Flagyl, Diflucan.  (dose adjusted for creatinine clearance 28)


Serial lactic acid and serial abdominal exams.  


General surgery and gastroenterology consults 





HEME: Monitor CBC





ENDO: Euglycemic





PROPH: SCDs for DVT prophylaxis.  Famotidine IV for stress ulcer prophylaxis





ACCESS: Peripheral IV providing adequate access at this time.





Overall impression: Patient has improved overnight.











Manuel Calhoun MD Mar 16, 2018 08:50

## 2018-03-17 NOTE — HHI.PR
__________________________________________________





Subjective


Subjective Notes


Weak but tolerating clears; moderate abdominal discomfort,but improved since 

surgery





Objective


Vitals/I&O





Vital Signs








  Date Time  Temp Pulse Resp B/P (MAP) Pulse Ox O2 Delivery O2 Flow Rate FiO2


 


3/17/18 18:10     95 Nasal Cannula 2.00 


 


3/17/18 16:48  81      


 


3/17/18 16:00 98.9  15 187/99 (128)    








Labs





Laboratory Tests








Test


  3/17/18


04:36


 


White Blood Count 13.7 


 


Red Blood Count 3.71 


 


Hemoglobin 11.9 


 


Hematocrit 35.1 


 


Mean Corpuscular Volume 94.6 


 


Mean Corpuscular Hemoglobin 32.0 


 


Mean Corpuscular Hemoglobin


Concent 33.8 


 


 


Red Cell Distribution Width 14.5 


 


Platelet Count 120 


 


Mean Platelet Volume 8.6 


 


Neutrophils (%) (Auto) 80.6 


 


Lymphocytes (%) (Auto) 8.4 


 


Monocytes (%) (Auto) 10.6 


 


Eosinophils (%) (Auto) 0.2 


 


Basophils (%) (Auto) 0.2 


 


Neutrophils # (Auto) 11.1 


 


Lymphocytes # (Auto) 1.1 


 


Monocytes # (Auto) 1.5 


 


Eosinophils # (Auto) 0.0 


 


Basophils # (Auto) 0.0 


 


CBC Comment AUTO DIFF 


 


Differential Total Cells


Counted 100 


 


 


Neutrophils % (Manual) 82 


 


Band Neutrophils % 9 


 


Lymphocytes % 5 


 


Monocytes % 4 


 


Neutrophils # (Manual) 12.5 


 


Differential Comment


  FINAL DIFF


MANUAL


 


Toxic Granulation 1+ 


 


Toxic Vacuolation PRESENT 


 


Platelet Estimate LOW 


 


Platelet Morphology Comment NORMAL 


 


Acanthocytes  


 


Blood Urea Nitrogen 14 


 


Creatinine 0.75 


 


Random Glucose 85 


 


Calcium Level 8.4 


 


Sodium Level 137 


 


Potassium Level 4.0 


 


Chloride Level 103 


 


Carbon Dioxide Level 25.0 


 


Anion Gap 9 


 


Estimat Glomerular Filtration


Rate 107 


 














 Date/Time


Source Procedure


Growth Status


 


 


 3/15/18 02:40


Blood Peripheral Aerobic Blood Culture - Preliminary


NO GROWTH IN 2 DAYS Resulted


 


 3/15/18 02:40


Blood Peripheral Anaerobic Blood Culture - Preliminary


NO GROWTH IN 2 DAYS Resulted





 3/15/18 12:31


Stool Stool Cryptosporidium Exam


Pending Received


 


 3/15/18 12:31


Stool Stool Giardia Antigen (JODI)


Pending Received








Radiology





Last 48 hours Impressions








Abdomen/Pelvis CT 3/15/18 0129 Signed





Impressions: 





 Service Date/Time:  Thursday, March 15, 2018 02:14 - CONCLUSION:  1. Acute 





 inflammatory process extending from the distal transverse colon to the distal 





 sigmoid colon. No significant diverticuli. This could relate to ischemia 

colitis 





 or infectious colitis. 2. No abscess or free air. 3. Well-distended 

gallbladder 





 without calcified gallstones, gallbladder wall thickening, or pericholecystic 





 fluid.     Wil Blue Jr., MD 








Lungs:  Clear


Abdomen:  Post-op tenderness


Narrative Exam


Steri strips dry





A/P


Assessment and Plan


Assessment and Plan


POD #2 dx lap with washout 








PLAN:


ABX


Pain control


Clears


trend labs


dvt ppx











Jaden Jean-Baptiste MD Mar 17, 2018 19:51

## 2018-03-17 NOTE — HHI.PR
Subjective


Remarks


57 year-old  male who states he has had 3 days of left lower quadrant 

abdominal pain.  He has had some nausea and says he has had 3 episodes of 

vomiting, nonbloody nonbilious, over the course of the last 3 days.  He has 

also had 2-3 loose bowel movements over the course of the 3 days.  His friend, 

Janel, tells me that she found him in the house listless in bed with large 

amounts of dark stool on the floor of the bathroom.  He denies fever or chills.

  Workup in the ED demonstrated white count 28,000 with 20% bands.  Lactic acid 

is 6.5.  CT abdomen and pelvis demonstrates colitis  distal transverse colon to 

distal sigmoid, ischemic v.  infectious   He is normotensive with normal heart 

rate.  Patient is tender in the left lower quadrant.  He feels like his pain is 

slightly improved today compared to yesterday.  He states he has been eating 

intermittently throughout the course of this and has not noted increased pain 

with by mouth intake.  Denies history of atrial fibrillation though he is on 

Cardizem.  He attributes his symptoms to opioid withdrawal because he ran out 

of his oxycodone a few days ago and states he has been taking them for over 15 

years





03/15 1800 hours: Urine output finally picking up but remains marginal. Lactic 

acidosis worse. Tenderness persists LLQ. Discussed with Dr. Blanco; probably 

requires exploration and will proceed.





03/16: Improved general clinical condition today. Lactic acidosis resolved and 

renal function better. Continue antibiotics pending C&S.





3-17 TRANSFERRED TO OUR SERVICE TODAY


SEEN BY GI AND SURGERY


NOT MUCH OF AN APPETITE


AM LABS





Objective


Vitals





Vital Signs








  Date Time  Temp Pulse Resp B/P (MAP) Pulse Ox O2 Delivery O2 Flow Rate FiO2


 


3/17/18 12:48  88      


 


3/17/18 12:00 97.8 88 19 180/96 (124) 96   


 


3/17/18 10:14     96 Nasal Cannula 2.50 


 


3/17/18 09:43  90      


 


3/17/18 08:00 99.3 112 19 167/84 (111) 91   


 


3/17/18 08:00     91 Nasal Cannula 3.00 


 


3/17/18 04:00 99.2 91 20 188/91 (123) 95   


 


3/17/18 00:00 99.1 94 20 176/89 (118) 99   


 


3/16/18 20:00 99.7 91 18 167/84 (111) 99   


 


3/16/18 19:00      Nasal Cannula 2.00 


 


3/16/18 16:00  97      














I/O      


 


 3/16/18 3/16/18 3/16/18 3/17/18 3/17/18 3/17/18





 07:00 15:00 23:00 07:00 15:00 23:00


 


Intake Total 270 ml  1573 ml 120 ml  


 


Output Total 1150 ml  2520 ml 3050 ml 1275 ml 


 


Balance -880 ml  -947 ml -2930 ml -1275 ml 


 


      


 


Intake Oral 70 ml   120 ml  


 


IV Total 200 ml  1573 ml   


 


Output Urine Total 800 ml  2250 ml 2900 ml 1200 ml 


 


Drainage Total 350 ml  270 ml 150 ml 75 ml 


 


# Bowel Movements 1     








Result Diagram:  


3/17/18 0436                                                                   

             3/17/18 0436





Other Results





 Laboratory Tests








Test


  3/15/18


00:18 3/15/18


01:31 3/15/18


04:44 3/15/18


04:54


 


White Blood Count 28.6 TH/MM3    


 


Red Blood Count 5.26 MIL/MM3    


 


Hemoglobin 16.3 GM/DL    


 


Hematocrit 49.5 %    


 


Mean Corpuscular Volume 94.2 FL    


 


Mean Corpuscular Hemoglobin 31.1 PG    


 


Mean Corpuscular Hemoglobin


Concent 33.0 % 


  


  


  


 


 


Red Cell Distribution Width 14.1 %    


 


Platelet Count 226 TH/MM3    


 


Mean Platelet Volume 8.5 FL    


 


Neutrophils (%) (Auto) 84.8 %    


 


Lymphocytes (%) (Auto) 4.5 %    


 


Monocytes (%) (Auto) 10.4 %    


 


Eosinophils (%) (Auto) 0.1 %    


 


Basophils (%) (Auto) 0.2 %    


 


Neutrophils # (Auto) 24.2 TH/MM3    


 


Lymphocytes # (Auto) 1.3 TH/MM3    


 


Monocytes # (Auto) 3.0 TH/MM3    


 


Eosinophils # (Auto) 0.0 TH/MM3    


 


Basophils # (Auto) 0.1 TH/MM3    


 


CBC Comment AUTO DIFF    


 


Differential Total Cells


Counted 100 


  


  


  


 


 


Neutrophils % (Manual) 49 %    


 


Band Neutrophils % 20 %    


 


Lymphocytes % 8 %    


 


Monocytes % 11 %    


 


Neutrophils # (Manual) 23.2 TH/MM3    


 


Metamyelocytes 12 %    


 


Differential Comment


  FINAL DIFF


MANUAL 


  


  


 


 


Atypical Lymphocytes  %    


 


Toxic Vacuolation PRESENT    


 


Platelet Estimate NORMAL    


 


Platelet Morphology Comment NORMAL    


 


Red Cell Morphology Comment NORMAL    


 


Blood Urea Nitrogen 31 MG/DL    


 


Creatinine 2.15 MG/DL    


 


Random Glucose 102 MG/DL    


 


Total Protein 7.5 GM/DL    


 


Albumin 3.2 GM/DL    


 


Calcium Level 8.9 MG/DL    


 


Alkaline Phosphatase 69 U/L    


 


Aspartate Amino Transf


(AST/SGOT) 31 U/L 


  


  


  


 


 


Alanine Aminotransferase


(ALT/SGPT) 16 U/L 


  


  


  


 


 


Total Bilirubin 0.8 MG/DL    


 


Sodium Level 140 MEQ/L    


 


Potassium Level 4.5 MEQ/L    


 


Chloride Level 102 MEQ/L    


 


Carbon Dioxide Level 23.5 MEQ/L    


 


Anion Gap 15 MEQ/L    


 


Estimat Glomerular Filtration


Rate 32 ML/MIN 


  


  


  


 


 


Lipase 56 U/L  54 U/L   


 


Lactic Acid Level  6.5 mmol/L  3.2 mmol/L  


 


Prothrombin Time    12.5 SEC 


 


Prothromb Time International


Ratio 


  


  


  1.2 RATIO 


 


 


Activated Partial


Thromboplast Time 


  


  


  31.0 SEC 


 


 


Test


  3/15/18


06:15 3/15/18


09:32 3/15/18


11:58 3/15/18


12:31


 


Urine Color YELLOW    


 


Urine Turbidity CLEAR    


 


Urine pH 6.0    


 


Urine Specific Gravity 1.020    


 


Urine Protein TRACE mg/dL    


 


Urine Glucose (UA) NEG mg/dL    


 


Urine Ketones NEG mg/dL    


 


Urine Occult Blood NEG    


 


Urine Nitrite NEG    


 


Urine Bilirubin NEG    


 


Urine Urobilinogen 2.0 MG/DL    


 


Urine Leukocyte Esterase TRACE    


 


Urine RBC 3 /hpf    


 


Urine WBC 1 /hpf    


 


Urine Squamous Epithelial


Cells 2 /hpf 


  


  


  


 


 


Urine Bacteria RARE /hpf    


 


Microscopic Urinalysis Comment


  CULT NOT


INDICATED 


  


  


 


 


Blood Urea Nitrogen  31 MG/DL   


 


Creatinine  1.14 MG/DL   


 


Random Glucose  88 MG/DL   


 


Calcium Level  7.7 MG/DL   


 


Sodium Level  141 MEQ/L   


 


Potassium Level  4.5 MEQ/L   


 


Chloride Level  110 MEQ/L   


 


Carbon Dioxide Level  24.3 MEQ/L   


 


Anion Gap  7 MEQ/L   


 


Estimat Glomerular Filtration


Rate 


  66 ML/MIN 


  


  


 


 


Lactic Acid Level  2.3 mmol/L   


 


HIV (1&2) Ab and P24 Ag, 4th


Gener 


  NONREACTIVE 


  


  


 


 


White Blood Count   15.6 TH/MM3  


 


Red Blood Count   3.86 MIL/MM3  


 


Hemoglobin   12.4 GM/DL  


 


Hematocrit   36.7 %  


 


Mean Corpuscular Volume   95.1 FL  


 


Mean Corpuscular Hemoglobin   32.2 PG  


 


Mean Corpuscular Hemoglobin


Concent 


  


  33.9 % 


  


 


 


Red Cell Distribution Width   14.4 %  


 


Platelet Count   134 TH/MM3  


 


Mean Platelet Volume   8.3 FL  


 


Neutrophils (%) (Auto)   79.2 %  


 


Lymphocytes (%) (Auto)   8.2 %  


 


Monocytes (%) (Auto)   12.4 %  


 


Eosinophils (%) (Auto)   0.1 %  


 


Basophils (%) (Auto)   0.1 %  


 


Neutrophils # (Auto)   12.4 TH/MM3  


 


Lymphocytes # (Auto)   1.3 TH/MM3  


 


Monocytes # (Auto)   1.9 TH/MM3  


 


Eosinophils # (Auto)   0.0 TH/MM3  


 


Basophils # (Auto)   0.0 TH/MM3  


 


CBC Comment   DIFF FINAL  


 


Differential Comment     


 


Stool C. difficile Toxin (PCR)    NEGATIVE 


 


Stl C. difficile Toxin


Epiderm 027 


  


  


  PRESUMPTIVE


NEGATIVE


 


Test


  3/15/18


13:59 3/15/18


21:45 3/16/18


03:53 3/17/18


04:36


 


Lactic Acid Level 5.1 mmol/L  2.2 mmol/L  1.8 mmol/L  


 


White Blood Count   11.5 TH/MM3  13.7 TH/MM3 


 


Red Blood Count   3.96 MIL/MM3  3.71 MIL/MM3 


 


Hemoglobin   12.5 GM/DL  11.9 GM/DL 


 


Hematocrit   37.5 %  35.1 % 


 


Mean Corpuscular Volume   94.7 FL  94.6 FL 


 


Mean Corpuscular Hemoglobin   31.5 PG  32.0 PG 


 


Mean Corpuscular Hemoglobin


Concent 


  


  33.3 % 


  33.8 % 


 


 


Red Cell Distribution Width   14.8 %  14.5 % 


 


Platelet Count   125 TH/MM3  120 TH/MM3 


 


Mean Platelet Volume   8.4 FL  8.6 FL 


 


CBC Comment   AUTO DIFF  AUTO DIFF 


 


Differential Total Cells


Counted 


  


  100 


  100 


 


 


Neutrophils % (Manual)   46 %  82 % 


 


Band Neutrophils %   28 %  9 % 


 


Lymphocytes %   9 %  5 % 


 


Monocytes %   4 %  4 % 


 


Neutrophils # (Manual)   10.0 TH/MM3  12.5 TH/MM3 


 


Metamyelocytes   13 %  


 


Differential Comment


  


  


  FINAL DIFF


MANUAL FINAL DIFF


MANUAL


 


Toxic Vacuolation   PRESENT  PRESENT 


 


Platelet Estimate   LOW  LOW 


 


Platelet Morphology Comment   NORMAL  NORMAL 


 


Prothrombin Time   12.4 SEC  


 


Prothromb Time International


Ratio 


  


  1.2 RATIO 


  


 


 


Blood Urea Nitrogen   19 MG/DL  14 MG/DL 


 


Creatinine   0.93 MG/DL  0.75 MG/DL 


 


Random Glucose   98 MG/DL  85 MG/DL 


 


Total Protein   5.4 GM/DL  


 


Albumin   2.2 GM/DL  


 


Calcium Level   7.2 MG/DL  8.4 MG/DL 


 


Phosphorus Level   2.0 MG/DL  


 


Magnesium Level   1.9 MG/DL  


 


Alkaline Phosphatase   41 U/L  


 


Aspartate Amino Transf


(AST/SGOT) 


  


  22 U/L 


  


 


 


Alanine Aminotransferase


(ALT/SGPT) 


  


  11 U/L 


  


 


 


Total Bilirubin   0.3 MG/DL  


 


Sodium Level   140 MEQ/L  137 MEQ/L 


 


Potassium Level   4.8 MEQ/L  4.0 MEQ/L 


 


Chloride Level   108 MEQ/L  103 MEQ/L 


 


Carbon Dioxide Level   26.1 MEQ/L  25.0 MEQ/L 


 


Anion Gap   6 MEQ/L  9 MEQ/L 


 


Estimat Glomerular Filtration


Rate 


  


  83 ML/MIN 


  107 ML/MIN 


 


 


Protein Corrected Calcium   8.1 MG/DL  


 


Random Vancomycin Level   4.0 COMMENT  


 


Neutrophils (%) (Auto)    80.6 % 


 


Lymphocytes (%) (Auto)    8.4 % 


 


Monocytes (%) (Auto)    10.6 % 


 


Eosinophils (%) (Auto)    0.2 % 


 


Basophils (%) (Auto)    0.2 % 


 


Neutrophils # (Auto)    11.1 TH/MM3 


 


Lymphocytes # (Auto)    1.1 TH/MM3 


 


Monocytes # (Auto)    1.5 TH/MM3 


 


Eosinophils # (Auto)    0.0 TH/MM3 


 


Basophils # (Auto)    0.0 TH/MM3 


 


Toxic Granulation    1+ 


 


Acanthocytes     








Imaging





Last Impressions








Abdomen/Pelvis CT 3/15/18 0129 Signed





Impressions: 





 Service Date/Time:  Thursday, March 15, 2018 02:14 - CONCLUSION:  1. Acute 





 inflammatory process extending from the distal transverse colon to the distal 





 sigmoid colon. No significant diverticuli. This could relate to ischemia 

colitis 





 or infectious colitis. 2. No abscess or free air. 3. Well-distended 

gallbladder 





 without calcified gallstones, gallbladder wall thickening, or pericholecystic 





 fluid.     Wil Blue Jr., MD 








Objective Remarks


GENERAL: AWAKE AND ALERT AND ORIENTED X3-IN NO ACUTE DISTRESS


SKIN: Warm and dry.


HEAD: Atraumatic. Normocephalic. 


EYES: Pupils equal and round. No scleral icterus. No injection or drainage. EOMI


ENT: No nasal bleeding or discharge.  Mucous membranes pink and moist. TONGUE 

MIDLINE


NECK: Trachea midline. No JVD. 


CARDIOVASCULAR: Regular rate and rhythm.  S1,S2 NO S3 OR S4 NO HEAVE OR THRILL


RESPIRATORY: No accessory muscle use. Clear to auscultation. Breath sounds 

equal bilaterally. 


GASTROINTESTINAL: Abdomen soft, SOME TENDERNESS, nondistended. Hepatic and 

splenic margins not palpable.  HAS DRAIN IN PLACE SOME TENDERNESS


MUSCULOSKELETAL: Extremities without clubbing, cyanosis,SOME EDEMA IN BL LE +1 

TO 2. No obvious deformities. 


NEUROLOGICAL: Awake and alert. No obvious cranial nerve deficits.  Motor 

grossly within normal limits. 4 out of 5 muscle strength in the arms and legs.  

Normal speech.


PSYCHIATRIC: Appropriate mood and affect; insight and judgment normal.


Procedures


Procedure Date:


Mar 15, 2018


Pre Op Diagnosis:  


acute abdomen, lactic acidosis, colonic ischemia


Post Op Diagnosis:  


acute abdomen, ascities


Surgeon:


Diomedes Blanco MD


Assistant(s):


none


Procedure:


dx lap, with washout, drain placement


Findings:


ascities, viable colon


Complications:


none


Specimen(s) removed:


none


Estimated blood loss:


5cc


Anesthesia:  General


Drains:  ANDREI


Patient to:  PACU


Patient Condition:  Fair











Diomedes Blanco MD


Medications and IVs





Current Medications


Oxycodone/ Acetaminophen (Percocet  Mg) 1 tab ONCE  ONCE PO  Last 

administered on 3/15/18at 00:53;  Start 3/15/18 at 00:45;  Stop 3/15/18 at 00:46

;  Status DC


Ondansetron HCl (Zofran Inj) 4 mg ONCE  ONCE IVP  Last administered on 3/15/

18at 02:37;  Start 3/15/18 at 01:30;  Stop 3/15/18 at 01:31;  Status DC


Sodium Chloride 1,000 ml @  1,000 mls/hr Q1H IV  Last administered on 3/15/18at 

02:38;  Start 3/15/18 at 01:29;  Stop 3/15/18 at 02:28;  Status DC


Sodium Chloride (NS Flush) 2 ml UNSCH  PRN IV FLUSH FLUSH AFTER USING IV ACCESS

;  Start 3/15/18 at 01:30;  Stop 3/15/18 at 06:06;  Status DC


Al Hydrox/Mg Hydrox/Simethicone (Mag-Al Plus Susp Liq) 30 ml ONCE  ONCE PO  

Last administered on 3/15/18at 02:37;  Start 3/15/18 at 01:30;  Stop 3/15/18 at 

01:31;  Status DC


Lidocaine HCl (Xylocaine 2% Viscous) 15 ml ONCE  ONCE PO  Last administered on 3

/15/18at 02:37;  Start 3/15/18 at 01:30;  Stop 3/15/18 at 01:31;  Status DC


Vancomycin HCl 1250 mg/Sodium Chloride 512.5 ml @  257.5 mls/ hr ONCE  STAT IV  

Last administered on 3/15/18at 03:58;  Start 3/15/18 at 03:00;  Stop 3/15/18 at 

04:59;  Status DC


Piperacillin Sod/ Tazobactam Sod 50 ml @  100 mls/hr ONCE  ONCE IV  Last 

administered on 3/15/18at 03:52;  Start 3/15/18 at 03:00;  Stop 3/15/18 at 03:29

;  Status DC


Sodium Chloride 1,000 ml @  999 mls/hr BOLUS  ONCE IV  Last administered on 3/15

/18at 03:51;  Start 3/15/18 at 03:15;  Stop 3/15/18 at 04:15;  Status DC


Hydromorphone HCl (Dilaudid Pf Inj) 1 mg ONCE  ONCE IV PUSH  Last administered 

on 3/15/18at 03:54;  Start 3/15/18 at 03:15;  Stop 3/15/18 at 03:16;  Status DC


Lactated Ringer's 1,000 ml @  150 mls/hr Q6H40M IV  Last administered on 3/17/

18at 13:41;  Start 3/15/18 at 05:00


Fluconazole/ Sodium Chloride 100 ml @  100 mls/hr Q24H IV  Last administered on 

3/17/18at 05:33;  Start 3/15/18 at 06:00


Pharmacy Profile Note 0 ml @ 0 mls/hr UNSCH OTHER ;  Start 3/15/18 at 05:00


Metronidazole 100 ml @  100 mls/hr Q8H IV  Last administered on 3/17/18at 08:09

;  Start 3/15/18 at 08:00


Cefepime HCl 2000 mg/Sodium Chloride 100 ml @  200 mls/hr Q12H IV  Last 

administered on 3/17/18at 11:33;  Start 3/15/18 at 12:00


Sodium Chloride (NS Flush) 2 ml UNSCH  PRN IV FLUSH FLUSH AFTER USING IV ACCESS

;  Start 3/15/18 at 06:00


Sodium Chloride (NS Flush) 2 ml BID IV FLUSH  Last administered on 3/17/18at 08:

10;  Start 3/15/18 at 09:00


Acetaminophen (Tylenol) 650 mg Q6H  PRN PO PAIN 1-2FEVER >101F;  Start 3/15/18 

at 06:00


Oxycodone/ Acetaminophen (Percocet  5-325 Mg) 1 tab Q4H  PRN PO PAIN 3-5 Last 

administered on 3/17/18at 03:05;  Start 3/15/18 at 06:00


Hydromorphone HCl (Dilaudid Pf Inj) 1 mg Q4H  PRN IV PUSH PAIN SCALE 6 TO 10 

Last administered on 3/15/18at 12:55;  Start 3/15/18 at 06:00;  Stop 3/15/18 at 

15:38;  Status DC


Famotidine (Pepcid Inj) 20 mg Q12HR IV PUSH  Last administered on 3/17/18at 08:

10;  Start 3/15/18 at 09:00


Ondansetron HCl (Zofran Inj) 4 mg Q6H  PRN IV PUSH NAUSEA OR VOMITING Last 

administered on 3/15/18at 08:51;  Start 3/15/18 at 06:00


Albuterol Sulfate (Albuterol Neb) 2.5 mg Q2HR NEB  PRN INH SOB/WHEEZING;  Start 

3/15/18 at 06:00


Miscellaneous Information 1 Q361D XX ;  Start 3/15/18 at 06:00


Chlorhexidine Gluconate (Chlorhexidine 2% Cloth) 3 pack Taper DAILY@04 TOP ;  

Start 3/16/18 at 04:00;  Stop 3/12/19 at 03:59


Chlorhexidine Gluconate (Chlorhexidine 2% Cloth) 3 pack UNSCH  PRN TOP HYGIENIC 

CARE;  Start 3/15/18 at 06:00


Sodium Chloride 1,000 ml @  999 mls/hr BOLUS  ONCE IV  Last administered on 3/15

/18at 08:30;  Start 3/15/18 at 08:15;  Stop 3/15/18 at 09:15;  Status DC


Sodium Chloride 1,000 ml @  999 mls/hr BOLUS  ONCE IV  Last administered on 3/15

/18at 09:45;  Start 3/15/18 at 08:15;  Stop 3/15/18 at 09:15;  Status DC


Magnesium Citrate (Citroma Liq) 300 ml ONCE  ONCE PO ;  Start 3/15/18 at 16:00;

  Stop 3/15/18 at 16:00;  Status DC


Magnesium Citrate (Citroma Liq) 300 ml ONCE  ONCE PO ;  Start 3/15/18 at 18:00;

  Stop 3/15/18 at 18:00;  Status DC


Pregabalin (Lyrica) 75 mg BID PO  Last administered on 3/17/18at 08:10;  Start 3

/15/18 at 21:00


Vasopressin (Pitressin Inj) 20 units STK-MED ONCE .ROUTE ;  Start 3/15/18 at 15:

35;  Stop 3/15/18 at 15:36;  Status DC


Hydromorphone HCl (Dilaudid Pf Inj) 1 mg Q3H  PRN IV PUSH PAIN SCALE 6 TO 10 

Last administered on 3/17/18at 11:33;  Start 3/15/18 at 15:45


Lorazepam (Ativan Inj) 1 mg Q4H  PRN IV PUSH Agitation;  Start 3/15/18 at 15:45


Bupivacaine HCl/ Epinephrine Bitart (Sensorcaine-Epinephrine Pf 0.5% Inj) 30 ml 

STK-MED ONCE .ROUTE  Last administered on 3/15/18at 17:27;  Start 3/15/18 at 15:

59;  Stop 3/15/18 at 16:00;  Status DC


Fentanyl Citrate (fentaNYL INJ) 200 mcg STK-MED ONCE .ROUTE ;  Start 3/15/18 at 

18:43;  Stop 3/15/18 at 18:44;  Status DC


Miscellaneous Information ALL NURSING DEPARTME... UNSCH  PRN .XX SEE LABEL 

COMMENTS;  Start 3/15/18 at 18:40;  Stop 3/16/18 at 18:39;  Status DC


Vancomycin HCl 1250 mg/Sodium Chloride 262.5 ml @  250 mls/hr Q12H IV  Last 

administered on 3/17/18at 12:44;  Start 3/16/18 at 13:00


Miscellaneous Information SPECIFIC LAB TO BE DRAWN:VANCOMYCIN TROUGH DATE TO... 

ONCE  ONCE .XX ;  Start 3/18/18 at 00:45;  Stop 3/18/18 at 00:46





A/P


Assessment and Plan


Assessment and Plan


NEURO:


Chronic pain


Insomnia


Oxycodone as needed for pain.  Dilaudid as needed for breakthrough pain.





RESP:


Tobacco abuse


On room air.  Albuterol every 2 hours as needed for wheezing





CV: 


Check EKG.  Monitor telemetry


Prior medical records states he is on Cardizem.  Unclear patient was still 

taking this.  He states he is on an unknown antihypertensive medication.





GI:


Abdominal pain with ? ischemic colitis


 Vomiting and diarrhea


Nothing by mouth.


Dr. Michelle has discussed with Dr. Blanco who will evaluate patient and 

possible ex lap.


He appears to be clinically improving with resuscitation so may be able to hold 

off for now. GI consulted for input and possible colonoscopy.  Monitor serial 

abdominal exams and lactic acid. 


obtain C diff. .


Plan laparoscopic exploration -> tea colored fluid, colon viable.


SP LAP EXPLORATION AND DRAIN PLACEMENT





FEN/RENAL: 


Acute kidney injury


Received 1 L NS in ED.  ml/hr. 


Maintain dawson and monitor I/O closely. F/u BMP





ID:


Severe Sepsis 


Colitis - ?Ischemic


Leukocytosis


Cultures have been sent.  We'll send urine culture.  Receive Zosyn and 

vancomycin in the emergency department.  We'll continue vancomycin, cefepime, 

Flagyl, Diflucan.  (dose adjusted for creatinine clearance 28)


Serial lactic acid and serial abdominal exams.  


General surgery and gastroenterology consults 





HEME: Monitor CBC





ENDO: Euglycemic





PROPH: SCDs for DVT prophylaxis.  Famotidine IV for stress ulcer prophylaxis





ACCESS: Peripheral IV providing adequate access at this time.





Overall impression: Patient has improved overnight.


Discharge Planning


PENDING SURGICAL AND GI CLEARANCE











Harjit Morrow DO Mar 17, 2018 15:34

## 2018-03-17 NOTE — HHI.GIFU
Subjective


Remarks


No nausea and vomiting


Abdominal lower drainage bag clear serous fluid


Awake, feeling somewhat better.  Hoping to go home soon


Low-grade fever 


Loose BM noted on 03/16/18


 (Zhane Jensen)





Objective


Vitals I&O





Vital Signs








  Date Time  Temp Pulse Resp B/P (MAP) Pulse Ox O2 Delivery O2 Flow Rate FiO2


 


3/17/18 10:14     96 Nasal Cannula 2.50 


 


3/17/18 09:43  90      


 


3/17/18 08:00 99.3 112 19 167/84 (111) 91   


 


3/17/18 08:00     91 Nasal Cannula 3.00 


 


3/17/18 04:00 99.2 91 20 188/91 (123) 95   


 


3/17/18 00:00 99.1 94 20 176/89 (118) 99   


 


3/16/18 20:00 99.7 91 18 167/84 (111) 99   


 


3/16/18 19:00      Nasal Cannula 2.00 


 


3/16/18 16:00  97      


 


3/16/18 14:30 98.7 111 23 174/87 (116) 93   


 


3/16/18 14:30     96 Nasal Cannula 2.00 














I/O      


 


 3/16/18 3/16/18 3/16/18 3/17/18 3/17/18 3/17/18





 07:00 15:00 23:00 07:00 15:00 23:00


 


Intake Total 270 ml  1573 ml 120 ml  


 


Output Total 1150 ml  2520 ml 3050 ml 1275 ml 


 


Balance -880 ml  -947 ml -2930 ml -1275 ml 


 


      


 


Intake Oral 70 ml   120 ml  


 


IV Total 200 ml  1573 ml   


 


Output Urine Total 800 ml  2250 ml 2900 ml 1200 ml 


 


Drainage Total 350 ml  270 ml 150 ml 75 ml 


 


# Bowel Movements 1     








Laboratory





Laboratory Tests








Test


  3/17/18


04:36


 


White Blood Count 13.7 


 


Red Blood Count 3.71 


 


Hemoglobin 11.9 


 


Hematocrit 35.1 


 


Mean Corpuscular Volume 94.6 


 


Mean Corpuscular Hemoglobin 32.0 


 


Mean Corpuscular Hemoglobin


Concent 33.8 


 


 


Red Cell Distribution Width 14.5 


 


Platelet Count 120 


 


Mean Platelet Volume 8.6 


 


Neutrophils (%) (Auto) 80.6 


 


Lymphocytes (%) (Auto) 8.4 


 


Monocytes (%) (Auto) 10.6 


 


Eosinophils (%) (Auto) 0.2 


 


Basophils (%) (Auto) 0.2 


 


Neutrophils # (Auto) 11.1 


 


Lymphocytes # (Auto) 1.1 


 


Monocytes # (Auto) 1.5 


 


Eosinophils # (Auto) 0.0 


 


Basophils # (Auto) 0.0 


 


CBC Comment AUTO DIFF 


 


Differential Total Cells


Counted 100 


 


 


Neutrophils % (Manual) 82 


 


Band Neutrophils % 9 


 


Lymphocytes % 5 


 


Monocytes % 4 


 


Neutrophils # (Manual) 12.5 


 


Differential Comment


  FINAL DIFF


MANUAL


 


Toxic Granulation 1+ 


 


Toxic Vacuolation PRESENT 


 


Platelet Estimate LOW 


 


Platelet Morphology Comment NORMAL 


 


Acanthocytes  


 


Blood Urea Nitrogen 14 


 


Creatinine 0.75 


 


Random Glucose 85 


 


Calcium Level 8.4 


 


Sodium Level 137 


 


Potassium Level 4.0 


 


Chloride Level 103 


 


Carbon Dioxide Level 25.0 


 


Anion Gap 9 


 


Estimat Glomerular Filtration


Rate 107 


 














 Date/Time


Source Procedure


Growth Status


 


 


 3/15/18 02:40


Blood Peripheral Aerobic Blood Culture - Preliminary


NO GROWTH IN 2 DAYS Resulted


 


 3/15/18 02:40


Blood Peripheral Anaerobic Blood Culture - Preliminary


NO GROWTH IN 2 DAYS Resulted





 3/15/18 12:31


Stool Stool Cryptosporidium Exam


Pending Received


 


 3/15/18 12:31


Stool Stool Giardia Antigen (JODI)


Pending Received








Imaging





Last Impressions








Abdomen/Pelvis CT 3/15/18 0129 Signed





Impressions: 





 Service Date/Time:  Thursday, March 15, 2018 02:14 - CONCLUSION:  1. Acute 





 inflammatory process extending from the distal transverse colon to the distal 





 sigmoid colon. No significant diverticuli. This could relate to ischemia 

colitis 





 or infectious colitis. 2. No abscess or free air. 3. Well-distended 

gallbladder 





 without calcified gallstones, gallbladder wall thickening, or pericholecystic 





 fluid.     Wil Blue Jr., MD 








Physical Exam


HEENT:   normocephalic; atraumatic; no jaundice.  


CHEST:  CTA, no obvious rhonchi or wheezing


CARDIAC:  S1-S2, 


ABDOMEN:  Soft, nondistended, continues with mild diffuse TTP but improving; no 

nausea vomiting; bowel sounds are present in all four quadrants.


   Clear serous  fluid in drain


EXTREMITIES: No clubbing, cyanosis, or edema.


SKIN:  Normal; no rash; no jaundice.


CNS:  Awake, conversational to simple questions


 (Zhane Jensen)





Assessment and Plan


Plan


ASSESSMENT


- n/v, abd pain, diarrhea, questionable blood in stool - onset 3 days ago.  

acute colitis seen on CT, 


   infectious vs ischemic.  WNL at this time.  distended and tender but does 

not appear to have acute abd at this time


   thinks he saw blood in his stool and some "dark" stool.  use to take NSAIDS 

frequently but quit few months ago d/t stomach pain


- leukocytosis - wbc 28.6   on cefepime, flagyl, diflucan


3/16/18  s/p diag lap and drain placement yesterday, bowel viable.  pt feels 

better today.  c diff neg.  diarrhea improved today.


  stool studies pending. 


03/17/18, minimal amount of drain via right mid to lower quadrant drainage 

system, denies any nausea or vomiting.


, Abdominal CT scan done on 3/15/18  shows acute inflammatory process in the 

distal transverse colon to the distal sigmoid colon.  No diverticuli , well 

distended gallbladder without calcified gallstones.  Hemoglobin stable at 11.9





PLAN


- diet per GS, clear liquids


- C. difficile negative


-HIV nonreactive


-IV Pepcid


-Flagyl


-Zofran


- further recs to follow


- colonoscopy as outpt





pt seen by myself and DR Whitley and this note is on his behalf


 (Zhane Jensen)


Physician Comments


Seen and examined, plan as above, stable at the current time.


Will follow up with you as needed.


 (Kristian Whitley MD)











Zhane Jensen Mar 17, 2018 12:22


Kristian Whitley MD Mar 17, 2018 22:42

## 2018-03-18 NOTE — RADRPT
EXAM DATE/TIME:  03/18/2018 11:54 

 

[facility] COMPARISON:     

No previous studies available for comparison.

       

 

 

INDICATIONS :     

CVA. Altered mental status.

                     

 

MEDICAL HISTORY :     

Hypertension.     

 

SURGICAL HISTORY :           

Spinal fusion. Bilateral hip replacements.

 

ENCOUNTER:     

Initial

 

ACUITY:     

3 day

 

PAIN SCORE:     

0/10

 

LOCATION:       

cranial 

 

TECHNIQUE:     

Multiplanar, multisequence MRI of the brain was performed without contrast.

 

FINDINGS:     

The diffusion restriction images demonstrate patchy areas of abnormal restricted diffusion signal inv
olving the right MCA and at least one punctate area in the right posterior cerebral distribution. Fin
dings would be consistent with areas of acute cortical infarction.

 

The ventricles are normal in size and configuration. No extra-axial fluid collections are identified.
 No mass lesion is present. Sagittal T1-weighted imaging demonstrates normal formation of the corpus 
callosum and midline structures. The cerebellar tonsils are in their appropriate location.

 

The appearance of the posterior fossa is unremarkable.

 

The visualized portion of sinus and orbit are intact.

 

CONCLUSION:     

1. 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
. No evidence of hemorrhage within this. No significant mass effect is identified.

 

 

 

 Iván Pratt MD on March 18, 2018 at 12:47           

Board Certified Radiologist.

 This report was verified electronically.

## 2018-03-18 NOTE — HHI.PR
Subjective


Remarks


57 year-old  male who states he has had 3 days of left lower quadrant 

abdominal pain.  He has had some nausea and says he has had 3 episodes of 

vomiting, nonbloody nonbilious, over the course of the last 3 days.  He has 

also had 2-3 loose bowel movements over the course of the 3 days.  His friend, 

Janel, tells me that she found him in the house listless in bed with large 

amounts of dark stool on the floor of the bathroom.  He denies fever or chills.

  Workup in the ED demonstrated white count 28,000 with 20% bands.  Lactic acid 

is 6.5.  CT abdomen and pelvis demonstrates colitis  distal transverse colon to 

distal sigmoid, ischemic v.  infectious   He is normotensive with normal heart 

rate.  Patient is tender in the left lower quadrant.  He feels like his pain is 

slightly improved today compared to yesterday.  He states he has been eating 

intermittently throughout the course of this and has not noted increased pain 

with by mouth intake.  Denies history of atrial fibrillation though he is on 

Cardizem.  He attributes his symptoms to opioid withdrawal because he ran out 

of his oxycodone a few days ago and states he has been taking them for over 15 

years





03/15 1800 hours: Urine output finally picking up but remains marginal. Lactic 

acidosis worse. Tenderness persists LLQ. Discussed with Dr. Blanco; probably 

requires exploration and will proceed.





03/16: Improved general clinical condition today. Lactic acidosis resolved and 

renal function better. Continue antibiotics pending C&S.





3-17 TRANSFERRED TO OUR SERVICE TODAY


SEEN BY GI AND SURGERY


NOT MUCH OF AN APPETITE


AM LABS





3-18 not moving LEFT UE WELL- HAD CAT SCAN TODAY


RESULTS REVIEWED


[facility] RN AND PT


STATES HE HAS HAD LEFT UE WEAKNESS, HE THINKS SINCE SURGERY


WILL CONSULT NEUROLOGY -[facility] NE[facility]OLOG[facility]  FELT TO BE A RIGHT MCA DISTRIBUTION CVA


[facility] RN AND PT


GET ECHO


HAS HAD  MRIS


START ASPIRIN AND STATIN


MAY NEED STENTING OF RIGHT COMMON CAROTID IN FUTURE





Objective


Vitals





Vital Signs








  Date Time  Temp Pulse Resp B/P (MAP) Pulse Ox O2 Delivery O2 Flow Rate FiO2


 


3/18/18 08:00 99.7 103 20 186/97 (126) 92   


 


3/18/18 04:00 98.4 72 20 155/78 (103) 97   


 


3/18/18 00:00 98.4 79 16 162/84 (110) 96   


 


3/17/18 20:57     96 Nasal Cannula 2.00 


 


3/17/18 20:00 97.5 81 25 97/56 (70) 98   


 


3/17/18 20:00  84      


 


3/17/18 20:00 98.3 83 29 174/93 (120) 95   


 


3/17/18 19:00     95 Nasal Cannula 2.00 


 


3/17/18 18:10     95 Nasal Cannula 2.00 


 


3/17/18 16:48  81      


 


3/17/18 16:00 98.9 80 15 187/99 (128) 97   


 


3/17/18 12:48  88      


 


3/17/18 12:00 97.8 88 19 180/96 (124) 96   


 


3/17/18 10:14     96 Nasal Cannula 2.50 


 


3/17/18 09:43  90      














I/O      


 


 3/17/18 3/17/18 3/17/18 3/18/18 3/18/18 3/18/18





 07:00 15:00 23:00 07:00 15:00 23:00


 


Intake Total 120 ml  240 ml   


 


Output Total 3050 ml 1275 ml 2000 ml 70 ml 970 ml 


 


Balance -2930 ml -1275 ml -1760 ml -70 ml -970 ml 


 


      


 


Intake Oral 120 ml  240 ml   


 


Output Urine Total 2900 ml 1200 ml 1950 ml  900 ml 


 


Drainage Total 150 ml 75 ml 50 ml 70 ml 70 ml 








Result Diagram:  


3/18/18 0548                                                                   

             3/18/18 0548





Other Results





 Laboratory Tests








Test


  3/15/18


11:58 3/15/18


12:31 3/15/18


13:59 3/15/18


21:45


 


White Blood Count 15.6 TH/MM3    


 


Red Blood Count 3.86 MIL/MM3    


 


Hemoglobin 12.4 GM/DL    


 


Hematocrit 36.7 %    


 


Mean Corpuscular Volume 95.1 FL    


 


Mean Corpuscular Hemoglobin 32.2 PG    


 


Mean Corpuscular Hemoglobin


Concent 33.9 % 


  


  


  


 


 


Red Cell Distribution Width 14.4 %    


 


Platelet Count 134 TH/MM3    


 


Mean Platelet Volume 8.3 FL    


 


Neutrophils (%) (Auto) 79.2 %    


 


Lymphocytes (%) (Auto) 8.2 %    


 


Monocytes (%) (Auto) 12.4 %    


 


Eosinophils (%) (Auto) 0.1 %    


 


Basophils (%) (Auto) 0.1 %    


 


Neutrophils # (Auto) 12.4 TH/MM3    


 


Lymphocytes # (Auto) 1.3 TH/MM3    


 


Monocytes # (Auto) 1.9 TH/MM3    


 


Eosinophils # (Auto) 0.0 TH/MM3    


 


Basophils # (Auto) 0.0 TH/MM3    


 


CBC Comment DIFF FINAL    


 


Differential Comment     


 


Stool C. difficile Toxin (PCR)  NEGATIVE   


 


Stl C. difficile Toxin


Epiderm 027 


  PRESUMPTIVE


NEGATIVE 


  


 


 


Lactic Acid Level   5.1 mmol/L  2.2 mmol/L 


 


Test


  3/16/18


03:53 3/17/18


04:36 3/18/18


00:30 3/18/18


05:48


 


White Blood Count 11.5 TH/MM3  13.7 TH/MM3   13.4 TH/MM3 


 


Red Blood Count 3.96 MIL/MM3  3.71 MIL/MM3   3.92 MIL/MM3 


 


Hemoglobin 12.5 GM/DL  11.9 GM/DL   12.5 GM/DL 


 


Hematocrit 37.5 %  35.1 %   37.1 % 


 


Mean Corpuscular Volume 94.7 FL  94.6 FL   94.6 FL 


 


Mean Corpuscular Hemoglobin 31.5 PG  32.0 PG   31.8 PG 


 


Mean Corpuscular Hemoglobin


Concent 33.3 % 


  33.8 % 


  


  33.6 % 


 


 


Red Cell Distribution Width 14.8 %  14.5 %   14.2 % 


 


Platelet Count 125 TH/MM3  120 TH/MM3   141 TH/MM3 


 


Mean Platelet Volume 8.4 FL  8.6 FL   8.5 FL 


 


CBC Comment AUTO DIFF  AUTO DIFF   DIFF FINAL 


 


Differential Total Cells


Counted 100 


  100 


  


  


 


 


Neutrophils % (Manual) 46 %  82 %   


 


Band Neutrophils % 28 %  9 %   


 


Lymphocytes % 9 %  5 %   


 


Monocytes % 4 %  4 %   


 


Neutrophils # (Manual) 10.0 TH/MM3  12.5 TH/MM3   


 


Metamyelocytes 13 %    


 


Differential Comment


  FINAL DIFF


MANUAL FINAL DIFF


MANUAL 


   


 


 


Toxic Vacuolation PRESENT  PRESENT   


 


Platelet Estimate LOW  LOW   


 


Platelet Morphology Comment NORMAL  NORMAL   


 


Prothrombin Time 12.4 SEC    


 


Prothromb Time International


Ratio 1.2 RATIO 


  


  


  


 


 


Blood Urea Nitrogen 19 MG/DL  14 MG/DL   11 MG/DL 


 


Creatinine 0.93 MG/DL  0.75 MG/DL   0.68 MG/DL 


 


Random Glucose 98 MG/DL  85 MG/DL   74 MG/DL 


 


Total Protein 5.4 GM/DL    6.2 GM/DL 


 


Albumin 2.2 GM/DL    2.2 GM/DL 


 


Calcium Level 7.2 MG/DL  8.4 MG/DL   8.2 MG/DL 


 


Phosphorus Level 2.0 MG/DL    1.1 MG/DL 


 


Magnesium Level 1.9 MG/DL    1.7 MG/DL 


 


Alkaline Phosphatase 41 U/L    71 U/L 


 


Aspartate Amino Transf


(AST/SGOT) 22 U/L 


  


  


  22 U/L 


 


 


Alanine Aminotransferase


(ALT/SGPT) 11 U/L 


  


  


  14 U/L 


 


 


Total Bilirubin 0.3 MG/DL    0.5 MG/DL 


 


Sodium Level 140 MEQ/L  137 MEQ/L   137 MEQ/L 


 


Potassium Level 4.8 MEQ/L  4.0 MEQ/L   3.2 MEQ/L 


 


Chloride Level 108 MEQ/L  103 MEQ/L   100 MEQ/L 


 


Carbon Dioxide Level 26.1 MEQ/L  25.0 MEQ/L   30.9 MEQ/L 


 


Anion Gap 6 MEQ/L  9 MEQ/L   6 MEQ/L 


 


Estimat Glomerular Filtration


Rate 83 ML/MIN 


  107 ML/MIN 


  


  120 ML/MIN 


 


 


Lactic Acid Level 1.8 mmol/L    


 


Protein Corrected Calcium 8.1 MG/DL    


 


Random Vancomycin Level 4.0 COMMENT    


 


Neutrophils (%) (Auto)  80.6 %   76.1 % 


 


Lymphocytes (%) (Auto)  8.4 %   10.4 % 


 


Monocytes (%) (Auto)  10.6 %   11.6 % 


 


Eosinophils (%) (Auto)  0.2 %   1.6 % 


 


Basophils (%) (Auto)  0.2 %   0.3 % 


 


Neutrophils # (Auto)  11.1 TH/MM3   10.2 TH/MM3 


 


Lymphocytes # (Auto)  1.1 TH/MM3   1.4 TH/MM3 


 


Monocytes # (Auto)  1.5 TH/MM3   1.6 TH/MM3 


 


Eosinophils # (Auto)  0.0 TH/MM3   0.2 TH/MM3 


 


Basophils # (Auto)  0.0 TH/MM3   0.0 TH/MM3 


 


Toxic Granulation  1+   


 


Acanthocytes     


 


Vancomycin Level Trough   10.6 MCG/ML  


 


Free Thyroxine    1.43 NG/DL 


 


Thyroid Stimulating Hormone


3rd Gen 


  


  


  2.350 uIU/ML 


 








Imaging





Last Impressions








Head CT 3/18/18 0000 Signed





Impressions: 





 Service Date/Time:  Sunday, March 18, 2018 08:35 - CONCLUSION: Focal areas of 





 encephalomalacia involving the right parietal lobe and right occipital lobe 





 consistent with old or subacute infarct. No evidence of adjacent edema. No 





 evidence of acute abnormality.     Ara Kerr MD 


 


Abdomen/Pelvis CT 3/15/18 0129 Signed





Impressions: 





 Service Date/Time:  Thursday, March 15, 2018 02:14 - CONCLUSION:  1. Acute 





 inflammatory process extending from the distal transverse colon to the distal 





 sigmoid colon. No significant diverticuli. This could relate to ischemia 

colitis 





 or infectious colitis. 2. No abscess or free air. 3. Well-distended 

gallbladder 





 without calcified gallstones, gallbladder wall thickening, or pericholecystic 





 fluid.     Wil Blue Jr., MD 








Objective Remarks


GENERAL: AWAKE AND ALERT AND ORIENTED X3-IN NO ACUTE DISTRESS- NEW LEFT UE 

WEAKNESS


SKIN: Warm and dry.


HEAD: Atraumatic. Normocephalic. 


EYES: Pupils equal and round. No scleral icterus. No injection or drainage. EOMI


ENT: No nasal bleeding or discharge.  Mucous membranes pink and moist. TONGUE 

MIDLINE


NECK: Trachea midline. No JVD. 


CARDIOVASCULAR: Regular rate and rhythm.  S1,S2 NO S3 OR S4 NO HEAVE OR THRILL


RESPIRATORY: No accessory muscle use. Clear to auscultation. Breath sounds 

equal bilaterally. 


GASTROINTESTINAL: Abdomen soft, SOME TENDERNESS, nondistended. Hepatic and 

splenic margins not palpable.  HAS DRAIN IN PLACE SOME TENDERNESS


MUSCULOSKELETAL: Extremities without clubbing, cyanosis,SOME EDEMA IN BL LE +1 

TO 2. No obvious deformities.  LEFT UE WEAKNESS/FLACCID


NEUROLOGICAL: Awake and alert. No obvious cranial nerve deficits.  Motor 

grossly within normal limits. 4 out of 5 muscle strength  IN RIGHT arm and 

legs.   LEFT UE FLACCID Normal speech.


PSYCHIATRIC: Appropriate mood and affect; insight and judgment normal.


Procedures


Procedure Date:


Mar 15, 2018


Pre Op Diagnosis:  


acute abdomen, lactic acidosis, colonic ischemia


Post Op Diagnosis:  


acute abdomen, ascities


Surgeon:


Diomedes Blanco MD


Assistant(s):


none


Procedure:


dx lap, with washout, drain placement


Findings:


ascities, viable colon


Complications:


none


Specimen(s) removed:


none


Estimated blood loss:


5cc


Anesthesia:  General


Drains:  ANDREI


Patient to:  PACU


Patient Condition:  Diomedes Bird MD


Medications and IVs





Current Medications


Oxycodone/ Acetaminophen (Percocet  Mg) 1 tab ONCE  ONCE PO  Last 

administered on 3/15/18at 00:53;  Start 3/15/18 at 00:45;  Stop 3/15/18 at 00:46

;  Status DC


Ondansetron HCl (Zofran Inj) 4 mg ONCE  ONCE IVP  Last administered on 3/15/

18at 02:37;  Start 3/15/18 at 01:30;  Stop 3/15/18 at 01:31;  Status DC


Sodium Chloride 1,000 ml @  1,000 mls/hr Q1H IV  Last administered on 3/15/18at 

02:38;  Start 3/15/18 at 01:29;  Stop 3/15/18 at 02:28;  Status DC


Sodium Chloride (NS Flush) 2 ml UNSCH  PRN IV FLUSH FLUSH AFTER USING IV ACCESS

;  Start 3/15/18 at 01:30;  Stop 3/15/18 at 06:06;  Status DC


Al Hydrox/Mg Hydrox/Simethicone (Mag-Al Plus Susp Liq) 30 ml ONCE  ONCE PO  

Last administered on 3/15/18at 02:37;  Start 3/15/18 at 01:30;  Stop 3/15/18 at 

01:31;  Status DC


Lidocaine HCl (Xylocaine 2% Viscous) 15 ml ONCE  ONCE PO  Last administered on 3

/15/18at 02:37;  Start 3/15/18 at 01:30;  Stop 3/15/18 at 01:31;  Status DC


Vancomycin HCl 1250 mg/Sodium Chloride 512.5 ml @  257.5 mls/ hr ONCE  STAT IV  

Last administered on 3/15/18at 03:58;  Start 3/15/18 at 03:00;  Stop 3/15/18 at 

04:59;  Status DC


Piperacillin Sod/ Tazobactam Sod 50 ml @  100 mls/hr ONCE  ONCE IV  Last 

administered on 3/15/18at 03:52;  Start 3/15/18 at 03:00;  Stop 3/15/18 at 03:29

;  Status DC


Sodium Chloride 1,000 ml @  999 mls/hr BOLUS  ONCE IV  Last administered on 3/15

/18at 03:51;  Start 3/15/18 at 03:15;  Stop 3/15/18 at 04:15;  Status DC


Hydromorphone HCl (Dilaudid Pf Inj) 1 mg ONCE  ONCE IV PUSH  Last administered 

on 3/15/18at 03:54;  Start 3/15/18 at 03:15;  Stop 3/15/18 at 03:16;  Status DC


Lactated Ringer's 1,000 ml @  150 mls/hr Q6H40M IV  Last administered on 3/18/

18at 05:27;  Start 3/15/18 at 05:00


Fluconazole/ Sodium Chloride 100 ml @  100 mls/hr Q24H IV  Last administered on 

3/18/18at 05:27;  Start 3/15/18 at 06:00


Pharmacy Profile Note 0 ml @ 0 mls/hr UNSCH OTHER ;  Start 3/15/18 at 05:00


Metronidazole 100 ml @  100 mls/hr Q8H IV  Last administered on 3/18/18at 08:19

;  Start 3/15/18 at 08:00


Cefepime HCl 2000 mg/Sodium Chloride 100 ml @  200 mls/hr Q12H IV  Last 

administered on 3/17/18at 22:13;  Start 3/15/18 at 12:00


Sodium Chloride (NS Flush) 2 ml UNSCH  PRN IV FLUSH FLUSH AFTER USING IV ACCESS 

Last administered on 3/18/18at 02:29;  Start 3/15/18 at 06:00


Sodium Chloride (NS Flush) 2 ml BID IV FLUSH  Last administered on 3/18/18at 08:

19;  Start 3/15/18 at 09:00


Acetaminophen (Tylenol) 650 mg Q6H  PRN PO PAIN 1-2FEVER >101F;  Start 3/15/18 

at 06:00


Oxycodone/ Acetaminophen (Percocet  5-325 Mg) 1 tab Q4H  PRN PO PAIN 3-5 Last 

administered on 3/17/18at 03:05;  Start 3/15/18 at 06:00


Hydromorphone HCl (Dilaudid Pf Inj) 1 mg Q4H  PRN IV PUSH PAIN SCALE 6 TO 10 

Last administered on 3/15/18at 12:55;  Start 3/15/18 at 06:00;  Stop 3/15/18 at 

15:38;  Status DC


Famotidine (Pepcid Inj) 20 mg Q12HR IV PUSH  Last administered on 3/18/18at 08:

19;  Start 3/15/18 at 09:00


Ondansetron HCl (Zofran Inj) 4 mg Q6H  PRN IV PUSH NAUSEA OR VOMITING Last 

administered on 3/15/18at 08:51;  Start 3/15/18 at 06:00


Albuterol Sulfate (Albuterol Neb) 2.5 mg Q2HR NEB  PRN INH SOB/WHEEZING;  Start 

3/15/18 at 06:00


Miscellaneous Information 1 Q361D XX ;  Start 3/15/18 at 06:00


Chlorhexidine Gluconate (Chlorhexidine 2% Cloth) 3 pack Taper DAILY@04 TOP ;  

Start 3/16/18 at 04:00;  Stop 3/12/19 at 03:59


Chlorhexidine Gluconate (Chlorhexidine 2% Cloth) 3 pack UNSCH  PRN TOP HYGIENIC 

CARE;  Start 3/15/18 at 06:00


Sodium Chloride 1,000 ml @  999 mls/hr BOLUS  ONCE IV  Last administered on 3/15

/18at 08:30;  Start 3/15/18 at 08:15;  Stop 3/15/18 at 09:15;  Status DC


Sodium Chloride 1,000 ml @  999 mls/hr BOLUS  ONCE IV  Last administered on 3/15

/18at 09:45;  Start 3/15/18 at 08:15;  Stop 3/15/18 at 09:15;  Status DC


Magnesium Citrate (Citroma Liq) 300 ml ONCE  ONCE PO ;  Start 3/15/18 at 16:00;

  Stop 3/15/18 at 16:00;  Status DC


Magnesium Citrate (Citroma Liq) 300 ml ONCE  ONCE PO ;  Start 3/15/18 at 18:00;

  Stop 3/15/18 at 18:00;  Status DC


Pregabalin (Lyrica) 75 mg BID PO  Last administered on 3/18/18at 08:20;  Start 3

/15/18 at 21:00


Vasopressin (Pitressin Inj) 20 units STK-MED ONCE .ROUTE ;  Start 3/15/18 at 15:

35;  Stop 3/15/18 at 15:36;  Status DC


Hydromorphone HCl (Dilaudid Pf Inj) 1 mg Q3H  PRN IV PUSH PAIN SCALE 6 TO 10 

Last administered on 3/18/18at 08:19;  Start 3/15/18 at 15:45


Lorazepam (Ativan Inj) 1 mg Q4H  PRN IV PUSH Agitation;  Start 3/15/18 at 15:45


Bupivacaine HCl/ Epinephrine Bitart (Sensorcaine-Epinephrine Pf 0.5% Inj) 30 ml 

STK-MED ONCE .ROUTE  Last administered on 3/15/18at 17:27;  Start 3/15/18 at 15:

59;  Stop 3/15/18 at 16:00;  Status DC


Fentanyl Citrate (fentaNYL INJ) 200 mcg STK-MED ONCE .ROUTE ;  Start 3/15/18 at 

18:43;  Stop 3/15/18 at 18:44;  Status DC


Miscellaneous Information ALL NURSING DEPARTME... UNSCH  PRN .XX SEE LABEL 

COMMENTS;  Start 3/15/18 at 18:40;  Stop 3/16/18 at 18:39;  Status DC


Vancomycin HCl 1250 mg/Sodium Chloride 262.5 ml @  250 mls/hr Q12H IV  Last 

administered on 3/18/18at 00:45;  Start 3/16/18 at 13:00


Miscellaneous Information SPECIFIC LAB TO BE DRAWN:VANCOMYCIN TROUGH DATE TO... 

ONCE  ONCE .XX  Last administered on 3/18/18at 00:45;  Start 3/18/18 at 00:45;  

Stop 3/18/18 at 00:46;  Status DC


Miscellaneous Information SPECIFIC LAB TO BE DRAWN:VA... ONCE  ONCE .XX ;  

Start 3/19/18 at 12:45;  Stop 3/19/18 at 12:46





A/P


Assessment and Plan


Assessment and Plan


NEURO:


Chronic pain


Insomnia


Oxycodone as needed for pain.  Dilaudid as needed for breakthrough pain.





RESP:


Tobacco abuse


On room air.  Albuterol every 2 hours as needed for wheezing





CV: 


Check EKG.  Monitor telemetry


Prior medical records states he is on Cardizem.  Unclear patient was still 

taking this.  He states he is on an unknown antihypertensive medication.





GI:


Abdominal pain with ? ischemic colitis


 Vomiting and diarrhea


Nothing by mouth.


Dr. Michelle has discussed with Dr. Blanco who will evaluate patient and 

possible ex lap.


He appears to be clinically improving with resuscitation so may be able to hold 

off for now. GI consulted for input and possible colonoscopy.  Monitor serial 

abdominal exams and lactic acid. 


obtain C diff. .


Plan laparoscopic exploration -> tea colored fluid, colon viable.


SP LAP EXPLORATION AND DRAIN PLACEMENT





FEN/RENAL: 


Acute kidney injury


Received 1 L NS in ED.  ml/hr. 


Maintain dawson and monitor I/O closely. F/u BMP





ID:


Severe Sepsis 


Colitis - ?Ischemic


Leukocytosis


Cultures have been sent.  We'll send urine culture.  Receive Zosyn and 

vancomycin in the emergency department.  We'll continue vancomycin, cefepime, 

Flagyl, Diflucan.  (dose adjusted for creatinine clearance 28)


Serial lactic acid and serial abdominal exams.  


General surgery and gastroenterology consults 





HEME: Monitor CBC





ENDO: Euglycemic





NEW LEFT UE WEAKNESS 0/5 DID NOT COMPLAIN ABOUT IT-NOTED TODAY


DW RN 


CONSULT NEUROLOGY


MRI WAS DONE


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


DW NEUROLOGY- MAY NEED STENTING OF RIGHT COMMON CAROTID AREA WITH STENOSIS IN 

FUTURE








TOBACCO ABUSE RECOMMEND SMOKING CESSATION





DW RN AND PATIENT AND NEUROLOGY AND GENERAL SURGERY





PROPH: SCDs for DVT prophylaxis.  Famotidine IV for stress ulcer prophylaxis





ACCESS: Peripheral IV providing adequate access at this time.


Discharge Planning


PENDING SURGICAL AND GI CLEARANCE AND NEUROLOGY CLEARANCE





START ASA  STATIN











Harjit Morrow DO Mar 18, 2018 09:37

## 2018-03-18 NOTE — RADRPT
EXAM DATE/TIME:  03/18/2018 11:54 

 

[facility] COMPARISON:     

MRI BRAIN W/O CONTRAST, March 18, 2018, 11:54.

       

 

 

INDICATIONS :     

CVA. Altered mental status.

                     

 

MEDICAL HISTORY :     

Hypertension.     

 

SURGICAL HISTORY :           

Spinal fusion. Bilateral hip replacements.

 

ENCOUNTER:     

Initial

 

ACUITY:     

3 day

 

PAIN SCORE:     

0/10

 

LOCATION:       

cranial 

 

Please note a normal MRA of the brain does not entirely exclude the possibility of a small aneurysm, 


nor the possibility of distal intracranial vessel disease.

 

TECHNIQUE:     

3D time of flight MRA was performed.  Source images, multiplanar STS MIP, and 3D volume MIP reconstru
ctions were reviewed.

 

FINDINGS:     

There is excellent visualization of the major intracranial arteries out to the second-order branch ve
ssels.  There is decreased size of the right anterior cerebral artery as compared to the left. The re
mainder of the vessels demonstrate symmetric normal caliber without evidence of aneurysm, vessel trun
cation or stenosis, and no evidence for vascular malformation.

 

CONCLUSION:     There is a decrease caliber of the right anterior cerebral artery (A1 segment) extend
ing from the branch point of the right internal cerebral artery and middle artery, however, the arter
y appears patent. 

 

 

 Ara Kerr MD on March 18, 2018 at 12:41           

Board Certified Radiologist.

 This report was verified electronically.

## 2018-03-18 NOTE — RADRPT
EXAM DATE/TIME:  03/18/2018 12:52 

 

[facility] COMPARISON:     

No previous studies available for comparison.

        

 

 

INDICATIONS :     

Cerebrovascular accident. 

                     

 

MEDICAL HISTORY :     

Hypertension.     Melena. Anxiety. Tobacco use. 

 

SURGICAL HISTORY :     

Tonsillectomy. Appendectomy.   Bilateral hip surgery. Back surgery. 

 

ENCOUNTER:     

Initial

 

ACUITY:     

1 day

 

PAIN SCORE:     

0/10

 

LOCATION:     

Bilateral neck 

                     

PEAK SYSTOLIC VELOCITIES (cm/sec):

 

ICA/CCA RATIO:                    

Right: 0.9     Left: 0.9

 

ICA:                          

Right: 72     Left: 133

 

CCA:                          

Right: 84     Left: 140

 

ECA:                           

Right: 43     Left: 161

 

VERTEBRAL:           

Right: 70 antegrade     Left: 86 antegrade

             

Elevated flow velocities and ICA/CCA ratios have been found to correlate with increased degrees of

vessel stenosis, calculated as percentage of diameter relative to a normal segment of distal ICA/CCA

 

FINDINGS:     

 

RIGHT CAROTID:     

There is fairly diffuse calcified atherosclerotic plaque. There is a small amount of soft plaque pres
ent as well. The waveform is within normal limits.

 

LEFT CAROTID:     

There is moderate diffuse calcified atherosclerotic plaquing. There is a small amount of soft plaque 
present as well. The waveform is within normal limits.

 

VERTEBRAL ARTERIES:  

Antegrade flow is seen in both vertebral arteries.

 

MISCELLANEOUS:     

None.

 

CONCLUSION:     

1. Diffuse calcified atherosclerotic plaque with some degree of soft plaque present at the carotid bi
furcations.

2. No hemodynamically significant carotid stenosis evident by velocity measurements.

3. Antegrade flow in both vertebral arteries.

 

 

 

 Iván Pratt MD on March 18, 2018 at 13:39           

Board Certified Radiologist.

 This report was verified electronically.

## 2018-03-18 NOTE — ECHRPT
Indication:   HEART FAILURE

 

 CONCLUSIONS

 Normal left ventricular size. 

 Wall thickness is normal. 

 The left ventricular systolic function is low normal with an estimated ejection fraction in the rang
e of 50-

 55%. 

 Trace mitral valve regurgitation. 

 Aortic valve sclerosis is present. 

 Trace aortic valve regurgitation. 

 

 

 BP:        /         HR:                          Rhythm:

 

 MEASUREMENTS  (Male / Female) Normal Values       Technical Quality:

 2D ECHO

 LV Diastolic Diameter PLAX        3.7 cm                4.2 - 5.9 / 3.9 - 5.3 cm

 IVS Diastolic Thickness           0.9 cm                0.6 - 1.0 / 0.6 - 0.9 cm

 LVPW Diastolic Thickness          0.6 cm                0.6 - 1.0 / 0.6 - 0.9 cm

 LV Relative Wall Thickness        0.4                   

 LA Systolic Diameter LX           3.2 cm                3.0 - 4.0 / 2.7 - 3.8 cm

 

 M-MODE

 Aortic Root Diameter MM           2.3 cm                

 AV Cusp Separation MM             1.1 cm                

 

 DOPPLER

 Mitral E Point Velocity           111.0 cm/s            

 Mitral A Point Velocity           43.2 cm/s             

 Mitral E to A Ratio               2.6                   

 TR Peak Velocity                  315.0 cm/s            

 TR Peak Gradient                  39.7 mmHg             

 

 

 FINDINGS

 

 LEFT VENTRICLE

 Normal left ventricular size. 

 Wall thickness is normal. 

 The left ventricular systolic function is low normal with an estimated ejection fraction in the rang
e of 50%. 

 

 RIGHT VENTRICLE

 Normal right ventricular size and systolic function.  

 

 LEFT ATRIUM

 The left atrial size is normal.  

 

 RIGHT ATRIUM

 The right atrial size is normal.  

 

 ATRIAL SEPTUM

 Normal atrial septal thickness without atrial level shunting by limited color doppler interrogation.
  

 

 AORTA

 The aortic root and proximal ascending aorta are normal in size on limited imaging.  

 

 MITRAL VALVE

 Trace mitral valve regurgitation. 

 

 AORTIC VALVE

 Aortic valve sclerosis is present. 

 Mild thickening of the aortic valve leaflets. 

 Trace aortic valve regurgitation. 

 

 TRICUSPID VALVE

 Structurally normal tricuspid valve. No tricuspid valve stenosis or regurgitation.  

 

 PULMONARY VALVE

 The pulmonary valve is not well visualized.  

 

 VESSELS

 The inferior vena cava is normal in size.  

 

 PERICARDIUM

 No pericardial effusion.  

 

 

 

 

  Lalit Gonzalez MD, FACC

  (Electronically Signed)

  Final Date:18 March 2018 16:46

## 2018-03-18 NOTE — RADRPT
EXAM DATE/TIME:  03/18/2018 11:54 

 

[facility] COMPARISON:     

MRI BRAIN W/O CONTRAST, March 18, 2018, 11:54.

       

 

 

INDICATIONS :     

Weakness.

                     

 

CONTRAST:     

20 cc Omniscan (gadodiamide) IV

                     

 

MEDICAL HISTORY :     

Hypertension.     

 

SURGICAL HISTORY :           

Bilateral hip replacements.

 

ENCOUNTER:     

Initial

 

ACUITY:     

1 day

 

PAIN SCORE:     

0/10

 

LOCATION:       

cranial 

 

Percent stenosis is calculated using the diameter of the stenotic region over the diameter of the nor
mal distal 

internal carotid artery.

 

TECHNIQUE:     

Bolus infused MRA of the extracranial circulation was performed using a neurovascular coil.  Post pro
cessing was performed including rotating subvolume maximum intensity projections of each carotid valerie
ry, rotating full volume maximum intensity projections of both carotid arteries, sagittal and coronal
 sliding thin slab reformations of each carotid artery, and left oblique sliding thin slab reformatio
n through the aortic arch to include the origin of the arch branch vessels.

 

FINDINGS:     

 

AORTIC ARCH:     

There is normal anatomic branching of the great vessels from the arch. The origins of the great vesse
ls are patent.

 

RIGHT CAROTID:     

The right common carotid demonstrates a high grade stenosis in its proximal segment just distal to it
s origin from the innominate. The more cephalad portion of the common carotid is widely patent. There
 is mild atherosclerotic plaquing at the bifurcation resulting in minimal stenosis of the origin of t
he right internal carotid this is less than 10% by NASCET criteria.

 

LEFT CAROTID:     

The left common carotid is widely patent. The overall appearance of the bifurcation on the left would
 suggest the possibility of a previous left carotid endarterectomy. The internal carotid is widely pa
tent throughout its course. There is mild stenosis at the origin of the left external carotid.

 

VERTEBRALS:     

There is an area of mild stenosis of the origin of the left vertebral. Both vertebrals are symmetric 
in size and patent throughout their course.

 

CONCLUSION:     

1. There is focal high-grade stenosis in the proximal right common carotid just above its origin from
 the innominate. This would be readily amenable to stent placement for treatment.

2. Minimal stenosis at the origin of the right internal carotid at the bifurcation.

3. Probable previous left carotid endarterectomy.

4. Mild stenosis at the origin of the left vertebral.

 

 

 

 Iván Pratt MD on March 18, 2018 at 13:14           

Board Certified Radiologist.

 This report was verified electronically.

## 2018-03-18 NOTE — PD.CONS
History of Present Illness


Service


Neurology


Consult Requested By


medical


Reason for Consult


weakness


Primary Care Physician


Keenan Tom MD


History of Present Illness


57 year-old  male admitted for abd pain. had acute abdomen, underwent 

exploratory lap. noted to have left sided weakness. the pt actually states it 

is getting better. states it has been weak for a few days. 





denies any hx of stroke/tia/afib. not on bloodthinners. hx of tob use 1ppd x 

decades. 








Review of Systems


ROS Limitations: as above





Past Family Social History


Allergies:  


Coded Allergies:  


     codeine (Unverified  Allergy, Severe, 3/15/18)


Past Medical History


HTN


Insomnia


Chronic pain





Past Surgical History


Right hip arthroplasty 7/31/2002


Left hip arthroplasty 6/30/2003








Family History


n/c





Social History


Homosexual


Smokes 1 ppd x decades





No illicit drug use.





Review of Systems


All other ROS:  ROS reviewed as documented in chart





Past Family Social History


Allergies:  


Coded Allergies:  


     codeine (Unverified  Allergy, Severe, 3/15/18)


Active Ordered Medications





Current Medications








 Medications


  (Trade)  Dose


 Ordered  Sig/Daniel


 Route  Start Time


 Stop Time Status Last Admin


 


 Lactated Ringer's  1,000 ml @ 


 150 mls/hr  Q6H40M


 IV  3/15/18 05:00


    3/18/18 11:04


 


 


 Fluconazole/


 Sodium Chloride  100 ml @ 


 100 mls/hr  Q24H


 IV  3/15/18 06:00


    3/18/18 05:27


 


 


 Pharmacy Profile


 Note  0 ml @ 0


 mls/hr  UNSCH


 OTHER  3/15/18 05:00


     


 


 


 Metronidazole  100 ml @ 


 100 mls/hr  Q8H


 IV  3/15/18 08:00


    3/18/18 08:19


 


 


 Cefepime HCl 2000


 mg/Sodium Chloride  100 ml @ 


 200 mls/hr  Q12H


 IV  3/15/18 12:00


    3/17/18 22:13


 


 


  (NS Flush)  2 ml  UNSCH  PRN


 IV FLUSH  3/15/18 06:00


    3/18/18 02:29


 


 


  (NS Flush)  2 ml  BID


 IV FLUSH  3/15/18 09:00


    3/18/18 08:19


 


 


  (Tylenol)  650 mg  Q6H  PRN


 PO  3/15/18 06:00


     


 


 


  (Percocet  5-325


 Mg)  1 tab  Q4H  PRN


 PO  3/15/18 06:00


    3/17/18 03:05


 


 


  (Pepcid Inj)  20 mg  Q12HR


 IV PUSH  3/15/18 09:00


    3/18/18 08:19


 


 


  (Zofran Inj)  4 mg  Q6H  PRN


 IV PUSH  3/15/18 06:00


    3/15/18 08:51


 


 


  (Albuterol Neb)  2.5 mg  Q2HR NEB  PRN


 INH  3/15/18 06:00


     


 


 


 Miscellaneous


 Information  1  Q361D


 XX  3/15/18 06:00


     


 


 


  (Chlorhexidine


 2% Cloth)  3 pack


 Taper  DAILY@04


 TOP  3/16/18 04:00


 3/12/19 03:59   


 


 


  (Chlorhexidine


 2% Cloth)  3 pack  UNSCH  PRN


 TOP  3/15/18 06:00


     


 


 


  (Lyrica)  75 mg  BID


 PO  3/15/18 21:00


    3/18/18 08:20


 


 


  (Dilaudid Pf Inj)  1 mg  Q3H  PRN


 IV PUSH  3/15/18 15:45


    3/18/18 08:19


 


 


  (Ativan Inj)  1 mg  Q4H  PRN


 IV PUSH  3/15/18 15:45


     


 


 


 Vancomycin HCl


 1250 mg/Sodium


 Chloride  262.5 ml @ 


 250 mls/hr  Q12H


 IV  3/16/18 13:00


    3/18/18 00:45


 


 


 Miscellaneous


 Information  SPECIFIC LAB


 TO BE


 DRAWN:VA...  ONCE  ONCE


 .XX  3/19/18 12:45


 3/19/18 12:46   


 


 


  (Aspirin Chew)  81 mg  DAILY


 PO  3/18/18 09:45


    3/18/18 10:19


 


 


  (Lipitor)  10 mg  HS


 PO  3/18/18 21:00


     


 


 


  (NovoLOG


 SUPPLEMENTAL


 SCALE)  1  ACHS


 SQ  3/18/18 12:00


     


 


 


  (D50w (Vial) Inj)  50 ml  UNSCH  PRN


 IV PUSH  3/18/18 09:45


     


 


 


  (Glucagon Inj)  1 mg  UNSCH  PRN


 OTHER  3/18/18 09:45


     


 











Exam


I&O / VS











 3/18/18 3/18/18 3/19/18





 15:00 23:00 07:00


 


Output Total 970 ml  


 


Balance -970 ml  


 


   


 


Output Urine Total 900 ml  


 


Drainage Total 70 ml  








Vital Signs








  Date Time  Temp Pulse Resp B/P (MAP) Pulse Ox O2 Delivery O2 Flow Rate FiO2


 


3/18/18 08:20      Nasal Cannula 4.00 


 


3/18/18 08:00 99.7 103 20 186/97 (126) 92   


 


3/18/18 04:00 98.4 72 20 155/78 (103) 97   


 


3/18/18 00:00 98.4 79 16 162/84 (110) 96   


 


3/17/18 20:57     96 Nasal Cannula 2.00 


 


3/17/18 20:00 97.5 81 25 97/56 (70) 98   


 


3/17/18 20:00  84      


 


3/17/18 20:00 98.3 83 29 174/93 (120) 95   


 


3/17/18 19:00     95 Nasal Cannula 2.00 


 


3/17/18 18:10     95 Nasal Cannula 2.00 


 


3/17/18 16:48  81      


 


3/17/18 16:00 98.9 80 15 187/99 (128) 97   


 


3/17/18 12:48  88      


 


3/17/18 12:00 97.8 88 19 180/96 (124) 96   








General:  Alert and Oriented, No acute distress


Eye:  EOMI, Normal conjuctiva


Respiratory:  Non-labored respirations


Cardiology:  Normal rate


Neurologic:  Alert, Oriented


Psychiatric:  Cooperative


Exam Comments


ox 3, follows, vff, reduced left nlf, left hemiparesis 2/5 arm weaker then leg, 

mild left hemisensory





Review/Management


Diagnosis/Plan:  


(1) Acute right MCA stroke


ICD Codes:  I63.511 - Cerebral infarction due to unspecified occlusion or 

stenosis of right middle cerebral artery


Status:  Acute


Plan:  subacute stroke


possibly 2/2 rt carotid stenosis 2/2 chronic tob use





likely occurred prior to operation





recs


echo


aspirin





rt carotid eval for cea/stent in a few weeks once he has healed from abdominal 

surgery and recovered from left hemiparesis


d/w pt/father





(2) Stenosis of right internal carotid artery


ICD Codes:  I65.21 - Occlusion and stenosis of right carotid artery


Status:  Chronic


(3) Tobacco abuse


ICD Codes:  Z72.0 - Tobacco use


Status:  Chronic


Plan:  cessation encouraged





(4) Colitis, acute


ICD Codes:  K52.9 - Noninfective gastroenteritis and colitis, unspecified; 

R65.20 - Severe sepsis without septic shock


Status:  Acute











George Wallace MD Mar 18, 2018 11:43

## 2018-03-18 NOTE — RADRPT
EXAM DATE/TIME:  03/18/2018 08:35 

 

[facility] COMPARISON:     

No previous studies available for comparison.

 

 

INDICATIONS :     

Altered mental status.

                      

 

RADIATION DOSE:     

56.35 CTDIvol (mGy) 

 

 

 

MEDICAL HISTORY :     

Hypertension.  

 

SURGICAL HISTORY :      

Tonsillectomy. 

 

ENCOUNTER:      

Initial

 

ACUITY:      

1 day

 

PAIN SCALE:      

0/10

 

LOCATION:       

Bilateral  head

 

TECHNIQUE:     

Multiple contiguous axial images were obtained of the head.  Using automated exposure control and adj
ustment of the mA and/or kV according to patient size, radiation dose was kept as low as reasonably a
chievable to obtain optimal diagnostic quality images.   DICOM format image data is available electro
nically for review and comparison.  

 

FINDINGS:     

 

CEREBRUM:     

The ventricles are normal for age. There are focal hypodensities identified within deep white matter 
of the right occipital lobe and right parietal lobe consistent with old or subacute infarcts. No evid
ence of adjacent edema or mass effect. No evidence of intra-axial mass or fluid collection. No extra-
axial fluid collections are seen.

 

POSTERIOR FOSSA:     

The cerebellum and brainstem are intact.  The 4th ventricle is midline.  The cerebellopontine angle i
s unremarkable.

 

EXTRACRANIAL:     

The visualized portion of the orbits is intact.

 

SKULL:     

The calvaria is intact.  No evidence of skull fracture.

 

CONCLUSION:     Focal areas of encephalomalacia involving the right parietal lobe and right occipital
 lobe consistent with old or subacute infarct. No evidence of adjacent edema. No evidence of acute ab
normality. 

 

 

 Ara Kerr MD on March 18, 2018 at 8:57           

Board Certified Radiologist.

 This report was verified electronically.

## 2018-03-18 NOTE — HHI.PR
__________________________________________________





Subjective


Subjective Notes


mri pending, concern for lue weakness, abdominal complains improving





Objective


Vitals/I&O





Vital Signs








  Date Time  Temp Pulse Resp B/P (MAP) Pulse Ox O2 Delivery O2 Flow Rate FiO2


 


3/18/18 17:49  82      


 


3/18/18 16:00 98.7  18 180/89 (119) 98   


 


3/18/18 08:20      Nasal Cannula 4.00 








Labs





Laboratory Tests








Test


  3/18/18


00:30 3/18/18


05:48


 


Vancomycin Level Trough 10.6  


 


White Blood Count  13.4 


 


Red Blood Count  3.92 


 


Hemoglobin  12.5 


 


Hematocrit  37.1 


 


Mean Corpuscular Volume  94.6 


 


Mean Corpuscular Hemoglobin  31.8 


 


Mean Corpuscular Hemoglobin


Concent 


  33.6 


 


 


Red Cell Distribution Width  14.2 


 


Platelet Count  141 


 


Mean Platelet Volume  8.5 


 


Neutrophils (%) (Auto)  76.1 


 


Lymphocytes (%) (Auto)  10.4 


 


Monocytes (%) (Auto)  11.6 


 


Eosinophils (%) (Auto)  1.6 


 


Basophils (%) (Auto)  0.3 


 


Neutrophils # (Auto)  10.2 


 


Lymphocytes # (Auto)  1.4 


 


Monocytes # (Auto)  1.6 


 


Eosinophils # (Auto)  0.2 


 


Basophils # (Auto)  0.0 


 


CBC Comment  DIFF FINAL 


 


Differential Comment   


 


Blood Urea Nitrogen  11 


 


Creatinine  0.68 


 


Random Glucose  74 


 


Total Protein  6.2 


 


Albumin  2.2 


 


Calcium Level  8.2 


 


Phosphorus Level  1.1 


 


Magnesium Level  1.7 


 


Alkaline Phosphatase  71 


 


Aspartate Amino Transf


(AST/SGOT) 


  22 


 


 


Alanine Aminotransferase


(ALT/SGPT) 


  14 


 


 


Total Bilirubin  0.5 


 


Sodium Level  137 


 


Potassium Level  3.2 


 


Chloride Level  100 


 


Carbon Dioxide Level  30.9 


 


Anion Gap  6 


 


Estimat Glomerular Filtration


Rate 


  120 


 


 


Hemoglobin A1c  5.7 


 


Free Thyroxine  1.43 


 


Thyroid Stimulating Hormone


3rd Gen 


  2.350 


 














 Date/Time


Source Procedure


Growth Status


 


 


 3/15/18 02:40


Blood Peripheral Aerobic Blood Culture - Preliminary


NO GROWTH IN 3 DAYS Resulted


 


 3/15/18 02:40


Blood Peripheral Anaerobic Blood Culture - Preliminary


NO GROWTH IN 3 DAYS Resulted





 3/15/18 12:31


Stool Stool Cryptosporidium Exam


Pending Received


 


 3/15/18 12:31


Stool Stool Giardia Antigen (JODI)


Pending Received








Radiology





Last 48 hours Impressions








Abdomen/Pelvis CT 3/15/18 0129 Signed





Impressions: 





 Service Date/Time:  Thursday, March 15, 2018 02:14 - CONCLUSION:  1. Acute 





 inflammatory process extending from the distal transverse colon to the distal 





 sigmoid colon. No significant diverticuli. This could relate to ischemia 

colitis 





 or infectious colitis. 2. No abscess or free air. 3. Well-distended 

gallbladder 





 without calcified gallstones, gallbladder wall thickening, or pericholecystic 





 fluid.     Wil Blue Jr., MD 








Abdomen:  Other (soft adilene serous)





A/P


Assessment and Plan


POD 3 dx lap with washout 


PLAN


ABX


neurology input for stroke w/u


adilene sxn


continue clear diet for now











Diomedes Blanco MD Mar 18, 2018 20:25

## 2018-03-19 NOTE — HHI.PR
__________________________________________________





Subjective


Subjective Notes


Still with LUE weakness; working with PT





Objective


Vitals/I&O





Vital Signs








  Date Time  Temp Pulse Resp B/P (MAP) Pulse Ox O2 Delivery O2 Flow Rate FiO2


 


3/19/18 08:06     96   


 


3/19/18 08:00 98.9 95 22 173/86 (115)    


 


3/18/18 20:55      Nasal Cannula 2.00 








Labs





Laboratory Tests








Test


  3/19/18


04:52


 


White Blood Count 8.2 


 


Red Blood Count 3.90 


 


Hemoglobin 12.4 


 


Hematocrit 36.1 


 


Mean Corpuscular Volume 92.6 


 


Mean Corpuscular Hemoglobin 31.9 


 


Mean Corpuscular Hemoglobin


Concent 34.5 


 


 


Red Cell Distribution Width 14.1 


 


Platelet Count 148 


 


Mean Platelet Volume 8.3 


 


Neutrophils (%) (Auto) 67.3 


 


Lymphocytes (%) (Auto) 13.8 


 


Monocytes (%) (Auto) 17.1 


 


Eosinophils (%) (Auto) 1.6 


 


Basophils (%) (Auto) 0.2 


 


Neutrophils # (Auto) 5.6 


 


Lymphocytes # (Auto) 1.1 


 


Monocytes # (Auto) 1.4 


 


Eosinophils # (Auto) 0.1 


 


Basophils # (Auto) 0.0 


 


CBC Comment DIFF FINAL 


 


Differential Comment  


 


Blood Urea Nitrogen 10 


 


Creatinine 0.76 


 


Random Glucose 92 


 


Total Protein 6.1 


 


Albumin 2.2 


 


Calcium Level 8.1 


 


Phosphorus Level 2.2 


 


Magnesium Level 1.7 


 


Alkaline Phosphatase 64 


 


Aspartate Amino Transf


(AST/SGOT) 22 


 


 


Alanine Aminotransferase


(ALT/SGPT) 13 


 


 


Total Bilirubin 0.4 


 


Sodium Level 137 


 


Potassium Level 3.2 


 


Chloride Level 98 


 


Carbon Dioxide Level 31.8 


 


Anion Gap 7 


 


Estimat Glomerular Filtration


Rate 105 


 


 


Triglycerides Level 107 


 


Cholesterol Level 75 


 


LDL Cholesterol 39 


 


HDL Cholesterol 14.5 


 


Cholesterol/HDL Ratio 5.17 














 Date/Time


Source Procedure


Growth Status


 


 


 3/15/18 02:40


Blood Peripheral Aerobic Blood Culture - Preliminary


NO GROWTH IN 4 DAYS Resulted


 


 3/15/18 02:40


Blood Peripheral Anaerobic Blood Culture - Preliminary


NO GROWTH IN 4 DAYS Resulted





 3/15/18 12:31


Stool Stool Cryptosporidium Exam - Final


NEGATIVE - NO CRYPTOSPORIDIUM ANTIGEN... Complete


 


 3/15/18 12:31 Giardia Antigen (JODI) - Final


Positive For Giardia Antigen Complete








Radiology





Last 48 hours Impressions








Abdomen/Pelvis CT 3/15/18 0129 Signed





Impressions: 





 Service Date/Time:  Thursday, March 15, 2018 02:14 - CONCLUSION:  1. Acute 





 inflammatory process extending from the distal transverse colon to the distal 





 sigmoid colon. No significant diverticuli. This could relate to ischemia 

colitis 





 or infectious colitis. 2. No abscess or free air. 3. Well-distended 

gallbladder 





 without calcified gallstones, gallbladder wall thickening, or pericholecystic 





 fluid.     Wil Blue Jr., MD 








Cardiovascular:  Regular


Lungs:  Clear


Abdomen:  Other (lap sites c/d/i; ADILENE with SS drainage ), Post-op tenderness


Extremities:  No edema





A/P


Assessment and Plan


57 year old male POD 3 dx lap; drain placement 





-Continue routine ADILENE care and monitoring


-Labs stable


-Neurology following


-Okay to de-escalate antibiotics 


-OOB as tolerated---PT following





Attending Statement


patient seen at bedside


doing better


monitor adilene output


abx





Attestation


The exam, history, and the medical decision-making described in the above note 

were completed with the assistance of the mid-level provider. I reviewed and 

agree with the findings presented.  I attest that I had a face-to-face 

encounter with the patient on the same day, and personally performed and 

documented my assessment and findings in the medical record.











Yolanda Dennison/[facility] ARNP Mar 19, 2018 11:50


Diomedes Blanco MD Mar 22, 2018 15:54

## 2018-03-19 NOTE — HHI.PR
Subjective


Remarks


Follow-up colitis and CVA.  Complains of right lower quadrant abdominal pain 

where he has a drain and chronic low back pain.  Discussed with nursing in 

general surgery.  Colitis likely ischemic





Objective


Vitals





Vital Signs








  Date Time  Temp Pulse Resp B/P (MAP) Pulse Ox O2 Delivery O2 Flow Rate FiO2


 


3/19/18 08:06     96   


 


3/19/18 08:00 98.9 95 22 173/86 (115) 92   


 


3/19/18 03:41 98.6 89 28 159/81 (107) 96   


 


3/19/18 00:00 100.1 93 15 121/75 (90) 93   


 


3/18/18 20:55     98 Nasal Cannula 2.00 


 


3/18/18 20:00 98.2 90 16 175/97 (123) 97   


 


3/18/18 20:00  90      


 


3/18/18 19:00     94 Nasal Cannula 2.00 


 


3/18/18 17:49  82      


 


3/18/18 16:00 98.7 81 18 180/89 (119) 98   


 


3/18/18 13:25   18     


 


3/18/18 13:00  94      


 


3/18/18 12:00 99.8 90 18 183/90 (121) 100   














I/O      


 


 3/18/18 3/18/18 3/18/18 3/19/18 3/19/18 3/19/18





 07:00 15:00 23:00 07:00 15:00 23:00


 


Intake Total  450 ml 7037 ml   


 


Output Total 70 ml 1060 ml 3750 ml 730 ml  


 


Balance -70 ml -610 ml 3287 ml -730 ml  


 


      


 


Intake Oral   360 ml   


 


IV Total  450 ml 6677 ml   


 


Output Urine Total  900 ml 3650 ml 700 ml  


 


Drainage Total 70 ml 160 ml 100 ml 30 ml  








Result Diagram:  


3/19/18 0452                                                                   

             3/19/18 0452





Imaging





Last Impressions








Neck Magnetic Resonance Angiography 3/18/18 0000 Signed





Impressions: 





 Service Date/Time:  Sunday, March 18, 2018 11:54 - CONCLUSION:  1. There is 





 focal high-grade stenosis in the proximal right common carotid just above its 





 origin from the innominate. This would be readily amenable to stent placement 





 for treatment. 2. Minimal stenosis at the origin of the right internal carotid 





 at the bifurcation. 3. Probable previous left carotid endarterectomy. 4. Mild 





 stenosis at the origin of the left vertebral.     Iván Pratt MD 


 


Head Magnetic Resonance Angiography 3/18/18 0000 Signed





Impressions: 





 Service Date/Time:  Sunday, March 18, 2018 11:54 - CONCLUSION: There is a 





 decrease caliber of the right anterior cerebral artery (A1 segment) extending 





 from the branch point of the right internal cerebral artery and middle artery, 





 however, the artery appears patent.     Ara Kerr MD 


 


Head CT 3/18/18 0000 Signed





Impressions: 





 Service Date/Time:  Sunday, March 18, 2018 08:35 - CONCLUSION: Focal areas of 





 encephalomalacia involving the right parietal lobe and right occipital lobe 





 consistent with old or subacute infarct. No evidence of adjacent edema. No 





 evidence of acute abnormality.     Ara Kerr MD 


 


Carotid Artery Ultrasound 3/18/18 0000 Signed





Impressions: 





 Service Date/Time:  Sunday, March 18, 2018 12:52 - CONCLUSION:  1. Diffuse 





 calcified atherosclerotic plaque with some degree of soft plaque present at 

the 





 carotid bifurcations. 2. No hemodynamically significant carotid stenosis 

evident 





 by velocity measurements. 3. Antegrade flow in both vertebral arteries.     





 Iván Pratt MD 


 


Brain MRI 3/18/18 0000 Signed





Impressions: 





 Service Date/Time:  Sunday, March 18, 2018 11:54 - CONCLUSION:  1. Patchy 

areas 





 of cortical infarct involving the right parietal and portions of the occipital 





 cortex. No evidence of hemorrhage within this. No significant mass effect is 





 identified.     Iván Pratt MD 


 


Abdomen/Pelvis CT 3/15/18 0129 Signed





Impressions: 





 Service Date/Time:  Thursday, March 15, 2018 02:14 - CONCLUSION:  1. Acute 





 inflammatory process extending from the distal transverse colon to the distal 





 sigmoid colon. No significant diverticuli. This could relate to ischemia 

colitis 





 or infectious colitis. 2. No abscess or free air. 3. Well-distended 

gallbladder 





 without calcified gallstones, gallbladder wall thickening, or pericholecystic 





 fluid.     Wil Blue Jr., MD 








Objective Remarks


GENERAL: Well-developed and well-nourished in no distress 


SKIN: Warm and dry.


CARDIOVASCULAR: Regular rate and rhythm.  S1 and S2


RESPIRATORY: No accessory muscle use. Clear to auscultation. Breath sounds 

equal bilaterally. 


GASTROINTESTINAL: Abdomen soft, mild right lower quadrant tenderness with drain 

in place, nondistended.


MUSCULOSKELETAL: Extremities without clubbing, cyanosis and edema. No obvious 

deformities. 


NEUROLOGICAL: Awake and alert. No obvious cranial nerve deficits.  Motor 

grossly within normal limits except for flaccid left upper extremity normal 

speech.


PSYCHIATRIC: Appropriate mood and affect; insight and judgment normal.


Procedures


Echocardiogram, diagnostic laparoscopic syncope with washout and drain placement

, procedure Date:


Mar 15, 2018


Pre Op Diagnosis:  


acute abdomen, lactic acidosis, colonic ischemia


Post Op Diagnosis:  


acute abdomen, ascities


Surgeon:


Diomedes Blanco MD


Assistant(s):


none


Procedure:


dx lap, with washout, drain placement


Findings:


ascities, viable colon


Complications:


none


Specimen(s) removed:


none


Estimated blood loss:


5cc


Anesthesia:  General


Drains:  ANDREI


Patient to:  PACU


Patient Condition:  Diomedes Bird MD





A/P


Problem List:  


(1) Acute right MCA stroke


ICD Code:  I63.511 - Cerebral infarction due to unspecified occlusion or 

stenosis of right middle cerebral artery


Status:  Acute


(2) Colitis, acute


ICD Code:  K52.9 - Noninfective gastroenteritis and colitis, unspecified; 

R65.20 - Severe sepsis without septic shock


Status:  Acute


Assessment and Plan


NEURO:


Chronic pain


Insomnia


Oxycodone and IV Dilaudid as needed 


Acute CVA with left upper extremity weakness.  Continue permissive hypertension

, aspirin and statin.  Echocardiogram shows EF of 50%.  Risk factor modification

, tobacco cessation.  A1c 5.1.  Physical therapy, OT and speech therapy 

evaluation.  Neurology consulted. Rt\t carotid eval for cea/stent in a few 

weeks once he has healed from abdominal surgery and recovered from left 

hemiparesis








RESP:


Tobacco abuse


On room air.  Albuterol every 2 hours as needed for wheezing





CV: 


Stable


Prior medical records states he is on Cardizem.  Unclear patient was still 

taking this.  He states he is on an unknown antihypertensive medication.





GI:


Likely ischemic colitis discussed with general surgery status post diagnostic 

laparoscopy, washout and drain placement


Culture positive for Giardia.  Patient clinically stable with no more nausea, 

vomiting diarrhea


Advance diet per general surgery.  Wound care.  De-escalate antibiotic will 

switch to p.o. Flagyl for a total of 7 days





FEN/RENAL: 


Acute kidney injury.  Improved


Received 1 L NS in ED.  ml/hr will decrease to 70 cc an hour. 


Monitor I/O closely and follow-up BMP.  Discontinue Barber catheter possibly in 

the morning when he is out of bed 





ID:


Severe Sepsis 


Colitis - ?Ischemic


Leukocytosis.  Resolved


Cultures negative to date except for Giardia.  De-escalate antibiotics as 

previously mentioned.  








DVT prophylaxis with SCD and early ambulation.  Pharmacological prophylaxis if 

okay with GS and neurology.


Discharge Planning


Possible transfer to [facility] when cleared by neurology and general surgery











Mendoza South MD Mar 19, 2018 10:11

## 2018-03-19 NOTE — HHI.PR
Review/Management


Diagnosis/Plan:  


(1) Acute right MCA stroke


ICD Codes:  I63.511 - Cerebral infarction due to unspecified occlusion or 

stenosis of right middle cerebral artery


Status:  Acute


Plan:  subacute stroke


possibly 2/2 rt carotid stenosis 2/2 chronic tob use





likely occurred prior to operation





recs


neuro stable


ok for 5th floor [facility] with tele


echo


aspirin





tobacco cessation


pt requesting chantix; was taking it at home





rt carotid eval for cea/stent in a few weeks once he has healed from abdominal 

surgery and recovered from left hemiparesis





(2) Stenosis of right internal carotid artery


ICD Codes:  I65.21 - Occlusion and stenosis of right carotid artery


Status:  Chronic


(3) Tobacco abuse


ICD Codes:  Z72.0 - Tobacco use


Status:  Chronic


Plan:  cessation encouraged





(4) Colitis, acute


ICD Codes:  K52.9 - Noninfective gastroenteritis and colitis, unspecified; 

R65.20 - Severe sepsis without septic shock


Status:  Acute





Subjective


Subjective Comments


No acute events reported


No headache


No chest pain


No dyspnea


Active Medications





Current Medications








 Medications


  (Trade)  Dose


 Ordered  Sig/Daniel


 Route  Start Time


 Stop Time Status Last Admin


 


 Lactated Ringer's  1,000 ml @ 


 70 mls/hr  [id number]


 IV  3/15/18 05:00


    3/18/18 23:35


 


 


 Fluconazole/


 Sodium Chloride  100 ml @ 


 100 mls/hr  Q24H


 IV  3/15/18 06:00


    3/19/18 04:48


 


 


 Pharmacy Profile


 Note  0 ml @ 0


 mls/hr  UNSCH


 OTHER  3/15/18 05:00


     


 


 


 Metronidazole  100 ml @ 


 100 mls/hr  Q8H


 IV  3/15/18 08:00


    3/19/18 09:16


 


 


 Cefepime HCl 2000


 mg/Sodium Chloride  100 ml @ 


 200 mls/hr  Q12H


 IV  3/15/18 12:00


    3/18/18 23:33


 


 


  (NS Flush)  2 ml  UNSCH  PRN


 IV FLUSH  3/15/18 06:00


    3/19/18 04:49


 


 


  (NS Flush)  2 ml  BID


 IV FLUSH  3/15/18 09:00


    3/19/18 09:17


 


 


  (Tylenol)  650 mg  Q6H  PRN


 PO  3/15/18 06:00


    3/19/18 09:09


 


 


  (Percocet  5-325


 Mg)  1 tab  Q4H  PRN


 PO  3/15/18 06:00


    3/18/18 21:12


 


 


  (Zofran Inj)  4 mg  Q6H  PRN


 IV PUSH  3/15/18 06:00


    3/15/18 08:51


 


 


  (Albuterol Neb)  2.5 mg  Q2HR NEB  PRN


 INH  3/15/18 06:00


     


 


 


 Miscellaneous


 Information  1  Q361D


 XX  3/15/18 06:00


     


 


 


  (Chlorhexidine


 2% Cloth)  3 pack


 Taper  DAILY@04


 TOP  3/16/18 04:00


 3/12/19 03:59   


 


 


  (Chlorhexidine


 2% Cloth)  3 pack  UNSCH  PRN


 TOP  3/15/18 06:00


     


 


 


  (Lyrica)  75 mg  BID


 PO  3/15/18 21:00


    3/19/18 09:09


 


 


  (Dilaudid Pf Inj)  1 mg  Q3H  PRN


 IV PUSH  3/15/18 15:45


    3/19/18 04:48


 


 


  (Ativan Inj)  1 mg  Q4H  PRN


 IV PUSH  3/15/18 15:45


     


 


 


 Vancomycin HCl


 1250 mg/Sodium


 Chloride  262.5 ml @ 


 250 mls/hr  Q12H


 IV  3/16/18 13:00


    3/19/18 01:00


 


 


 Miscellaneous


 Information  SPECIFIC LAB


 TO BE


 DRAWN:VA...  ONCE  ONCE


 .XX  3/19/18 12:45


 3/19/18 12:46   


 


 


  (Lipitor)  10 mg  HS


 PO  3/18/18 21:00


    3/18/18 21:11


 


 


  (NovoLOG


 SUPPLEMENTAL


 SCALE)  1  ACHS


 SQ  3/18/18 12:00


     


 


 


  (D50w (Vial) Inj)  50 ml  UNSCH  PRN


 IV PUSH  3/18/18 09:45


     


 


 


  (Glucagon Inj)  1 mg  UNSCH  PRN


 OTHER  3/18/18 09:45


     


 


 


  (Aspirin Chew)  325 mg  DAILY


 PO  3/19/18 09:00


    3/19/18 09:09


 


 


  (Pepcid)  20 mg  BID


 PO  3/19/18 09:00


    3/19/18 09:16


 








Allergies





Allergies


Coded Allergies


  codeine (Unverified Allergy, Severe, 3/15/18)





Review of Systems


All other ROS:  ROS reviewed as documented in chart





Exam


I&O / VS





Vital Signs








  Date Time  Temp Pulse Resp B/P (MAP) Pulse Ox O2 Delivery O2 Flow Rate FiO2


 


3/19/18 08:06     96   


 


3/19/18 08:00 98.9 95 22 173/86 (115) 92   


 


3/19/18 03:41 98.6 89 28 159/81 (107) 96   


 


3/19/18 00:00 100.1 93 15 121/75 (90) 93   


 


3/18/18 20:55     98 Nasal Cannula 2.00 


 


3/18/18 20:00 98.2 90 16 175/97 (123) 97   


 


3/18/18 20:00  90      


 


3/18/18 19:00     94 Nasal Cannula 2.00 


 


3/18/18 17:49  82      


 


3/18/18 16:00 98.7 81 18 180/89 (119) 98   


 


3/18/18 13:25   18     


 


3/18/18 13:00  94      


 


3/18/18 12:00 99.8 90 18 183/90 (121) 100   








General:  Alert and Oriented, No acute distress


Eye:  EOMI, Normal conjuctiva


Respiratory:  Non-labored respirations


Cardiology:  Normal rate


Neurologic:  Alert, Oriented


Psychiatric:  Cooperative


Exam Comments


ox 3, follows, visual hallucination at times?, vff, reduced left nlf, left 

hemiparesis 2-3/5 arm weaker then leg, mild left hemisensory





Objective


Micro and Labs





Laboratory Tests








Test


  3/19/18


04:52


 


White Blood Count 8.2 


 


Red Blood Count 3.90 


 


Hemoglobin 12.4 


 


Hematocrit 36.1 


 


Mean Corpuscular Volume 92.6 


 


Mean Corpuscular Hemoglobin 31.9 


 


Mean Corpuscular Hemoglobin


Concent 34.5 


 


 


Red Cell Distribution Width 14.1 


 


Platelet Count 148 


 


Mean Platelet Volume 8.3 


 


Neutrophils (%) (Auto) 67.3 


 


Lymphocytes (%) (Auto) 13.8 


 


Monocytes (%) (Auto) 17.1 


 


Eosinophils (%) (Auto) 1.6 


 


Basophils (%) (Auto) 0.2 


 


Neutrophils # (Auto) 5.6 


 


Lymphocytes # (Auto) 1.1 


 


Monocytes # (Auto) 1.4 


 


Eosinophils # (Auto) 0.1 


 


Basophils # (Auto) 0.0 


 


CBC Comment DIFF FINAL 


 


Differential Comment  


 


Blood Urea Nitrogen 10 


 


Creatinine 0.76 


 


Random Glucose 92 


 


Total Protein 6.1 


 


Albumin 2.2 


 


Calcium Level 8.1 


 


Phosphorus Level 2.2 


 


Magnesium Level 1.7 


 


Alkaline Phosphatase 64 


 


Aspartate Amino Transf


(AST/SGOT) 22 


 


 


Alanine Aminotransferase


(ALT/SGPT) 13 


 


 


Total Bilirubin 0.4 


 


Sodium Level 137 


 


Potassium Level 3.2 


 


Chloride Level 98 


 


Carbon Dioxide Level 31.8 


 


Anion Gap 7 


 


Estimat Glomerular Filtration


Rate 105 


 


 


Triglycerides Level 107 


 


Cholesterol Level 75 


 


LDL Cholesterol 39 


 


HDL Cholesterol 14.5 


 


Cholesterol/HDL Ratio 5.17 














 Date/Time


Source Procedure


Growth Status


 


 


 3/15/18 02:40


Blood Peripheral Aerobic Blood Culture - Preliminary


NO GROWTH IN 3 DAYS Resulted


 


 3/15/18 02:40


Blood Peripheral Anaerobic Blood Culture - Preliminary


NO GROWTH IN 3 DAYS Resulted





 3/15/18 12:31


Stool Stool Cryptosporidium Exam


Pending Received


 


 3/15/18 12:31


Stool Stool Giardia Antigen (JODI)


Pending Received

















George Wallace MD Mar 19, 2018 09:33

## 2018-03-19 NOTE — MP
cc:

Diomedes Blanco MD

****

 

 

DATE OF OPERATION:

03/15/2018

 

PREOPERATIVE DIAGNOSIS:

Acute abdomen, lactic acidosis, concern for colonic ischemia.

 

POSTOPERATIVE DIAGNOSIS:

Acute abdomen, ascites.

 

SURGEON:

Diomedes Blanco MD

 

ASSISTANT:

See OR sheet.

 

ANESTHESIA:

GETA.

 

INTRAVENOUS FLUIDS:

See anesthesia sheet.

 

PROCEDURES PERFORMED:

1.  Diagnostic laparoscopy.

2.  Laparoscopic washout of ascites.

3.  Drain placement.

 

FINDINGS:

Tannish-colored ascites.  Small bowel appeared viable without evidence

of perforation.

 

COMPLICATIONS:

None.

 

SPECIMENS:

None.

 

ESTIMATED BLOOD LOSS:

5 mL

 

INDICATION:

The patient is a 57-year-old male who presents with acute onset of 

abdominal pain.  The patient noted to be having sudden onset of 

abdominal pain, left greater than right quadrants with rebound and 

some guarding and concern for ischemic colitis versus pending 

perforated viscus.  Therefore, decision was made for operative 

intervention including diagnostic laparoscopy.

 

DETAILS OF THE PROCEDURE:

The patient was taken to the operating suite, placed in supine 

position.  He was prepped and draped in usual sterile fashion after 

induction of general endotracheal anesthesia.  A brief timeout done 

stating correct patient, procedure, surgical site.  We were all in 

agreement with this.  Attention first directed to the umbilicus with 

local anesthetic injected.  Stab nick incision was made with a 15 

blade.  The visiport Optiview trocar was used to enter the abdomen 

safely.  Abdomen insufflated to 15 mm pneumoperitoneum.  On cursory 

inspection, no evidence of injury.  The abdomen was noted to have this

tannish darkish ascites-like fluid in it.  The colon was identified 

and noted to be somewhat inflamed, but noted to be pink and viable.  

There were some adhesions in the left upper quadrant as well.  Two 

other ports were placed, 1 right upper quadrant and right lower 

quadrant port with 5 mm, local anesthetic injected, done under direct 

visualization.  Small bowel was run without evidence of perforation or

abnormality.  Again, the colon was further examined and no evidence of

perforation, except there were some adhesions, left upper quadrant.  

Stomach also looked good with again no evidence of perforation.  At 

this point, a liter saline bag was used to irrigate thoroughly the 

abdomen and suction the fluid out.  A 19 [place] ANDREI drain was placed, 

secured with a 2-0 nylon through the right lower quadrant incision.  

The pneumoperitoneum was removed, trocars were removed, 4-0 Monocryl 

used for subcuticular sutures.  Sterile dressings then placed, 

including Mastisol and Steri-Strips.  The patient tolerated the 

procedure well.  There were no intraoperative complications.  All lap 

and instrument counts were correct at the end of procedure.  The 

patient was extubated and taken to PACU.

 

 

__________________________________

MD BOO Ramsey/PEYMAN

D: 03/19/2018, 04:11 PM

T: 03/19/2018, 06:14 PM

Visit #: [id number]

Job #: [id number]

SOHAM

## 2018-03-19 NOTE — HHI.GIFU
Subjective


Remarks


Pt resting in bed.  tolerating clears but does not like the chicken broth.  c/o 

some abd pain around drain site.  No BM in 2 days.


 (Candice Gustafson)





Objective


Vitals I&O





Vital Signs








  Date Time  Temp Pulse Resp B/P (MAP) Pulse Ox O2 Delivery O2 Flow Rate FiO2


 


3/19/18 12:33  84      


 


3/19/18 12:00 99.9 89 22 150/75 (100) 94   


 


3/19/18 08:30  97      


 


3/19/18 08:06     96   


 


3/19/18 08:00 98.9 95 22 173/86 (115) 92   


 


3/19/18 07:45      Nasal Cannula 2.00 


 


3/19/18 03:41 98.6 89 28 159/81 (107) 96   


 


3/19/18 00:00 100.1 93 15 121/75 (90) 93   


 


3/18/18 20:55     98 Nasal Cannula 2.00 


 


3/18/18 20:00 98.2 90 16 175/97 (123) 97   


 


3/18/18 20:00  90      


 


3/18/18 19:00     94 Nasal Cannula 2.00 


 


3/18/18 17:49  82      


 


3/18/18 16:00 98.7 81 18 180/89 (119) 98   














I/O      


 


 3/18/18 3/18/18 3/18/18 3/19/18 3/19/18 3/19/18





 07:00 15:00 23:00 07:00 15:00 23:00


 


Intake Total  450 ml 7037 ml  100 ml 


 


Output Total 70 ml 1060 ml 3750 ml 730 ml 400 ml 


 


Balance -70 ml -610 ml 3287 ml -730 ml -300 ml 


 


      


 


Intake Oral   360 ml   


 


IV Total  450 ml 6677 ml  100 ml 


 


Output Urine Total  900 ml 3650 ml 700 ml 350 ml 


 


Drainage Total 70 ml 160 ml 100 ml 30 ml 50 ml 








Laboratory





Laboratory Tests








Test


  3/19/18


04:52


 


White Blood Count 8.2 


 


Red Blood Count 3.90 


 


Hemoglobin 12.4 


 


Hematocrit 36.1 


 


Mean Corpuscular Volume 92.6 


 


Mean Corpuscular Hemoglobin 31.9 


 


Mean Corpuscular Hemoglobin


Concent 34.5 


 


 


Red Cell Distribution Width 14.1 


 


Platelet Count 148 


 


Mean Platelet Volume 8.3 


 


Neutrophils (%) (Auto) 67.3 


 


Lymphocytes (%) (Auto) 13.8 


 


Monocytes (%) (Auto) 17.1 


 


Eosinophils (%) (Auto) 1.6 


 


Basophils (%) (Auto) 0.2 


 


Neutrophils # (Auto) 5.6 


 


Lymphocytes # (Auto) 1.1 


 


Monocytes # (Auto) 1.4 


 


Eosinophils # (Auto) 0.1 


 


Basophils # (Auto) 0.0 


 


CBC Comment DIFF FINAL 


 


Differential Comment  


 


Blood Urea Nitrogen 10 


 


Creatinine 0.76 


 


Random Glucose 92 


 


Total Protein 6.1 


 


Albumin 2.2 


 


Calcium Level 8.1 


 


Phosphorus Level 2.2 


 


Magnesium Level 1.7 


 


Alkaline Phosphatase 64 


 


Aspartate Amino Transf


(AST/SGOT) 22 


 


 


Alanine Aminotransferase


(ALT/SGPT) 13 


 


 


Total Bilirubin 0.4 


 


Sodium Level 137 


 


Potassium Level 3.2 


 


Chloride Level 98 


 


Carbon Dioxide Level 31.8 


 


Anion Gap 7 


 


Estimat Glomerular Filtration


Rate 105 


 


 


Triglycerides Level 107 


 


Cholesterol Level 75 


 


LDL Cholesterol 39 


 


HDL Cholesterol 14.5 


 


Cholesterol/HDL Ratio 5.17 














 Date/Time


Source Procedure


Growth Status


 


 


 3/15/18 02:40


Blood Peripheral Aerobic Blood Culture - Preliminary


NO GROWTH IN 4 DAYS Resulted


 


 3/15/18 02:40


Blood Peripheral Anaerobic Blood Culture - Preliminary


NO GROWTH IN 4 DAYS Resulted





 3/15/18 12:31


Stool Stool Cryptosporidium Exam - Final


NEGATIVE - NO CRYPTOSPORIDIUM ANTIGEN... Complete


 


 3/15/18 12:31 Giardia Antigen (JODI) - Final


Positive For Giardia Antigen Complete








Imaging





Last Impressions








Neck Magnetic Resonance Angiography 3/18/18 0000 Signed





Impressions: 





 Service Date/Time:  Sunday, March 18, 2018 11:54 - CONCLUSION:  1. There is 





 focal high-grade stenosis in the proximal right common carotid just above its 





 origin from the innominate. This would be readily amenable to stent placement 





 for treatment. 2. Minimal stenosis at the origin of the right internal carotid 





 at the bifurcation. 3. Probable previous left carotid endarterectomy. 4. Mild 





 stenosis at the origin of the left vertebral.     Iván Pratt MD 


 


Head Magnetic Resonance Angiography 3/18/18 0000 Signed





Impressions: 





 Service Date/Time:  Sunday, March 18, 2018 11:54 - CONCLUSION: There is a 





 decrease caliber of the right anterior cerebral artery (A1 segment) extending 





 from the branch point of the right internal cerebral artery and middle artery, 





 however, the artery appears patent.     Ara Kerr MD 


 


Head CT 3/18/18 0000 Signed





Impressions: 





 Service Date/Time:  Sunday, March 18, 2018 08:35 - CONCLUSION: Focal areas of 





 encephalomalacia involving the right parietal lobe and right occipital lobe 





 consistent with old or subacute infarct. No evidence of adjacent edema. No 





 evidence of acute abnormality.     Ara Kerr MD 


 


Carotid Artery Ultrasound 3/18/18 0000 Signed





Impressions: 





 Service Date/Time:  Sunday, March 18, 2018 12:52 - CONCLUSION:  1. Diffuse 





 calcified atherosclerotic plaque with some degree of soft plaque present at 

the 





 carotid bifurcations. 2. No hemodynamically significant carotid stenosis 

evident 





 by velocity measurements. 3. Antegrade flow in both vertebral arteries.     





 Iván Pratt MD 


 


Brain MRI 3/18/18 0000 Signed





Impressions: 





 Service Date/Time:  Sunday, March 18, 2018 11:54 - CONCLUSION:  1. Patchy 

areas 





 of cortical infarct involving the right parietal and portions of the occipital 





 cortex. No evidence of hemorrhage within this. No significant mass effect is 





 identified.     Iván Pratt MD 


 


Abdomen/Pelvis CT 3/15/18 0129 Signed





Impressions: 





 Service Date/Time:  Thursday, March 15, 2018 02:14 - CONCLUSION:  1. Acute 





 inflammatory process extending from the distal transverse colon to the distal 





 sigmoid colon. No significant diverticuli. This could relate to ischemia 

colitis 





 or infectious colitis. 2. No abscess or free air. 3. Well-distended 

gallbladder 





 without calcified gallstones, gallbladder wall thickening, or pericholecystic 





 fluid.     Wil Blue Jr., MD 








Physical Exam


HEENT:   normocephalic; atraumatic; no jaundice.  


CHEST:  CTA


CARDIAC: RRR 


ABDOMEN:  Soft, mildly distended, mild abd TTP near drain site; no nausea 

vomiting; bowel sounds are present in all four quadrants.


EXTREMITIES: No clubbing, cyanosis, or edema.


SKIN:  Normal; no rash; no jaundice.


CNS:  AO X 3


 (Candice Gustafson ARNP)





Assessment and Plan


Plan


ASSESSMENT


- n/v, abd pain, diarrhea, questionable blood in stool - onset 3 days ago.  

acute colitis seen on CT, 


   infectious vs ischemic. HH WNL at this time.  distended and tender but does 

not appear to have acute abd at this time


   thinks he saw blood in his stool and some "dark" stool.  use to take NSAIDS 

frequently but quit few months ago d/t stomach pain


- leukocytosis - wbc 28.6   on cefepime, flagyl, diflucan


3/16/18  s/p diag lap and drain placement yesterday, bowel viable.  pt feels 

better today.  c diff neg.  diarrhea improved today.


  stool studies pending. 


03/17/18, minimal amount of drain via right mid to lower quadrant drainage 

system, denies any nausea or vomiting.


, Abdominal CT scan done on 3/15/18  shows acute inflammatory process in the 

distal transverse colon to the distal sigmoid colon.  No diverticuli , well 

distended gallbladder without calcified gallstones.  Hemoglobin stable at 11.9


3/19/18  resting in bed.  No BM.  + flatus.  some pain around drain site





PLAN 


- diet per GS


-IV Pepcid


-Flagyl


-Zofran


- colonoscopy outpt





pt seen by myself and DR Wong and this note is on her behalf


 (Candice Gustafson)


Physician Comments


seen, examined


agree with above


 (Albania Wong MD)











Candice Gustafson Mar 19, 2018 15:23


Albania Wong MD Mar 19, 2018 18:36

## 2018-03-20 NOTE — HHI.PR
Review/Management


Diagnosis/Plan:  


(1) Acute right MCA stroke


ICD Codes:  I63.511 - Cerebral infarction due to unspecified occlusion or 

stenosis of right middle cerebral artery


Status:  Acute


Plan:  subacute stroke


possibly 2/2 rt carotid stenosis 2/2 chronic tob use





likely occurred prior to operation


ef 50-55%





recs


neuro stable





told pt to hold off on opiods with recent stroke





tobacco cessation


chantix





rehab





rt carotid eval for cea/stent in a few weeks once he has healed from abdominal 

surgery and recovered from left hemiparesis





(2) Stenosis of right internal carotid artery


ICD Codes:  I65.21 - Occlusion and stenosis of right carotid artery


Status:  Chronic


(3) Tobacco abuse


ICD Codes:  Z72.0 - Tobacco use


Status:  Chronic


Plan:  cessation encouraged





(4) Colitis, acute


ICD Codes:  K52.9 - Noninfective gastroenteritis and colitis, unspecified; 

R65.20 - Severe sepsis without septic shock


Status:  Acute





Subjective


Subjective Comments


No acute events reported


No headache


c/o of chronic pain- states he takes oxy qid at home





No chest pain


No dyspnea


Active Medications





Current Medications








 Medications


  (Trade)  Dose


 Ordered  Sig/Daniel


 Route  Start Time


 Stop Time Status Last Admin


 


 Lactated Ringer's  1,000 ml @ 


 70 mls/hr  [id number]


 IV  3/15/18 05:00


    3/20/18 09:10


 


 


  (NS Flush)  2 ml  UNSCH  PRN


 IV FLUSH  3/15/18 06:00


    3/19/18 04:49


 


 


  (NS Flush)  2 ml  BID


 IV FLUSH  3/15/18 09:00


    3/20/18 09:18


 


 


  (Tylenol)  650 mg  Q6H  PRN


 PO  3/15/18 06:00


    3/19/18 09:09


 


 


  (Percocet  5-325


 Mg)  1 tab  Q4H  PRN


 PO  3/15/18 06:00


    3/19/18 21:33


 


 


  (Zofran Inj)  4 mg  Q6H  PRN


 IV PUSH  3/15/18 06:00


    3/15/18 08:51


 


 


  (Albuterol Neb)  2.5 mg  Q2HR NEB  PRN


 INH  3/15/18 06:00


     


 


 


 Miscellaneous


 Information  1  Q361D


 XX  3/15/18 06:00


     


 


 


  (Chlorhexidine


 2% Cloth)  3 pack


 Taper  DAILY@04


 TOP  3/16/18 04:00


 3/12/19 03:59   


 


 


  (Chlorhexidine


 2% Cloth)  3 pack  UNSCH  PRN


 TOP  3/15/18 06:00


     


 


 


  (Lyrica)  75 mg  BID


 PO  3/15/18 21:00


    3/20/18 09:09


 


 


  (Dilaudid Pf Inj)  1 mg  Q3H  PRN


 IV PUSH  3/15/18 15:45


    3/20/18 09:11


 


 


  (Ativan Inj)  1 mg  Q4H  PRN


 IV PUSH  3/15/18 15:45


    3/19/18 16:45


 


 


  (Lipitor)  10 mg  HS


 PO  3/18/18 21:00


    3/19/18 21:17


 


 


  (NovoLOG


 SUPPLEMENTAL


 SCALE)  1  ACHS


 SQ  3/18/18 12:00


     


 


 


  (D50w (Vial) Inj)  50 ml  UNSCH  PRN


 IV PUSH  3/18/18 09:45


     


 


 


  (Glucagon Inj)  1 mg  UNSCH  PRN


 OTHER  3/18/18 09:45


     


 


 


  (Aspirin Chew)  325 mg  DAILY


 PO  3/19/18 09:00


    3/20/18 09:17


 


 


  (Pepcid)  20 mg  BID


 PO  3/19/18 09:00


    3/20/18 09:09


 


 


  (Chantix)  0.5 mg  Q12HR


 PO  3/19/18 10:00


    3/20/18 09:09


 


 


  (Vasotec Inj)  1.25 mg  Q4H  PRN


 IV PUSH  3/19/18 10:30


     


 


 


  (Flagyl)  500 mg  BID


 PO  3/19/18 21:00


 3/22/18 12:44  3/20/18 09:09


 








Allergies





Allergies


Coded Allergies


  codeine (Unverified Allergy, Severe, 3/15/18)





Review of Systems


All other ROS:  ROS reviewed as documented in chart





Exam


I&O / VS





Vital Signs








  Date Time  Temp Pulse Resp B/P (MAP) Pulse Ox O2 Delivery O2 Flow Rate FiO2


 


3/20/18 08:00 98.9 92 22 141/90 (107) 98   


 


3/20/18 04:00 99.9 73 27 180/87 (118) 98   


 


3/20/18 04:00 99.9 73 27 180/87 (118) 98   


 


3/20/18 00:00 99.5 92 13 146/61 (89) 96   


 


3/19/18 20:00     96 Nasal Cannula 2.00 


 


3/19/18 20:00 101.0 92 22 195/89 (124) 96   


 


3/19/18 19:34        21


 


3/19/18 16:00 98.7 88 22 159/89 (112) 98   


 


3/19/18 12:33  84      


 


3/19/18 12:00 99.9 89 22 150/75 (100) 94   








General:  Alert and Oriented, No acute distress


Eye:  EOMI, Normal conjuctiva


Respiratory:  Non-labored respirations


Cardiology:  Normal rate


Neurologic:  Alert, Oriented


Psychiatric:  Cooperative


Exam Comments


ox 3, follows,  vff, reduced left nlf, left hemiparesis 3+/5 arm weaker then leg

, mild left hemisensory





Objective


Micro and Labs





Laboratory Tests








Test


  3/20/18


09:40














 Date/Time


Source Procedure


Growth Status


 


 


 3/15/18 02:40


Blood Peripheral Aerobic Blood Culture - Preliminary


NO GROWTH IN 4 DAYS Resulted


 


 3/15/18 02:40


Blood Peripheral Anaerobic Blood Culture - Preliminary


NO GROWTH IN 4 DAYS Resulted





 3/15/18 12:31


Stool Stool Cryptosporidium Exam - Final


NEGATIVE - NO CRYPTOSPORIDIUM ANTIGEN... Complete


 


 3/15/18 12:31 Giardia Antigen (JODI) - Final


Positive For Giardia Antigen Complete

















George Wallace MD Mar 20, 2018 10:01

## 2018-03-20 NOTE — HHI.PR
__________________________________________________





Subjective


Subjective Notes


Resting in bed working with PT





Objective


Vitals/I&O





Vital Signs








  Date Time  Temp Pulse Resp B/P (MAP) Pulse Ox O2 Delivery O2 Flow Rate FiO2


 


3/20/18 12:00     96 Nasal Cannula 2.00 


 


3/20/18 12:00 99.0 100 23 143/83 (103)    


 


3/19/18 19:34        21








Labs





Laboratory Tests








Test


  3/20/18


09:40


 


White Blood Count 10.8 


 


Red Blood Count 4.29 


 


Hemoglobin 13.6 


 


Hematocrit 39.6 


 


Mean Corpuscular Volume 92.5 


 


Mean Corpuscular Hemoglobin 31.7 


 


Mean Corpuscular Hemoglobin


Concent 34.3 


 


 


Red Cell Distribution Width 14.5 


 


Platelet Count 253 


 


Mean Platelet Volume 9.5 


 


Neutrophils (%) (Auto) 70.7 


 


Lymphocytes (%) (Auto) 13.4 


 


Monocytes (%) (Auto) 14.7 


 


Eosinophils (%) (Auto) 0.8 


 


Basophils (%) (Auto) 0.4 


 


Neutrophils # (Auto) 7.7 


 


Lymphocytes # (Auto) 1.5 


 


Monocytes # (Auto) 1.6 


 


Eosinophils # (Auto) 0.1 


 


Basophils # (Auto) 0.0 


 


CBC Comment DIFF FINAL 


 


Differential Comment  


 


Blood Urea Nitrogen 10 


 


Creatinine 0.75 


 


Random Glucose 115 


 


Calcium Level 8.6 


 


Magnesium Level 1.8 


 


Sodium Level 136 


 


Potassium Level 3.2 


 


Chloride Level 97 


 


Carbon Dioxide Level 30.8 


 


Anion Gap 8 


 


Estimat Glomerular Filtration


Rate 107 


 














 Date/Time


Source Procedure


Growth Status


 


 


 3/15/18 02:40


Blood Peripheral Aerobic Blood Culture - Final


NO GROWTH IN 5 DAYS Complete


 


 3/15/18 02:40


Blood Peripheral Anaerobic Blood Culture - Final


NO GROWTH IN 5 DAYS Complete





 3/15/18 12:31


Stool Stool Cryptosporidium Exam - Final


NEGATIVE - NO CRYPTOSPORIDIUM ANTIGEN... Complete


 


 3/15/18 12:31 Giardia Antigen (JODI) - Final


Positive For Giardia Antigen Complete








Radiology





Last 48 hours Impressions








Abdomen/Pelvis CT 3/15/18 0129 Signed





Impressions: 





 Service Date/Time:  Thursday, March 15, 2018 02:14 - CONCLUSION:  1. Acute 





 inflammatory process extending from the distal transverse colon to the distal 





 sigmoid colon. No significant diverticuli. This could relate to ischemia 

colitis 





 or infectious colitis. 2. No abscess or free air. 3. Well-distended 

gallbladder 





 without calcified gallstones, gallbladder wall thickening, or pericholecystic 





 fluid.     Wil Blue Jr., MD 








Cardiovascular:  Regular


Lungs:  Clear


Abdomen:  Other (lap sites c/d/i; ADILENE with serous fluid )


Extremities:  No edema


Narrative Exam


LUE weakness





A/P


Assessment and Plan


57 year old male POD4 dx lap; drain placement 





-Advance to full liquids + Ensure 


-Continue routine ADILENE care and monitoring---will likely DC soon 


-Okay to start anticoagulation from GS standpoint 


-Neurology following---continues to have LUE weakness 


-Okay to de-escalate antibiotics; watch for fevers 


-OOB as tolerated---PT following





Attending Statement


patient seen at bedside


intra- abdominal pathology appears to be improving


continue medical w/u


adilene sxn





Attestation


The exam, history, and the medical decision-making described in the above note 

were completed with the assistance of the mid-level provider. I reviewed and 

agree with the findings presented.  I attest that I had a face-to-face 

encounter with the patient on the same day, and personally performed and 

documented my assessment and findings in the medical record.











Yolanda Dennison/First Assist CARRIE Mar 20, 2018 15:46


Diomedes Blanco MD Mar 25, 2018 06:22

## 2018-03-20 NOTE — RADRPT
EXAM DATE/TIME:  03/20/2018 11:18 

 

[facility] COMPARISON:     

No previous studies available for comparison.

        

 

 

INDICATIONS :                

Left arm swelling. 

            

 

MEDICAL HISTORY :     

Hypertension.   Melena. Tobacco use. Anxiety. Abdominal pain. Nausea/vomiting.

 

SURGICAL HISTORY :     

Tonsillectomy. Appendectomy.   Bilateral hip replacements. Back surgery. 

 

ENCOUNTER:     

Initial

 

ACUITY:     

2 day

 

PAIN SCORE:      

3/10

 

LOCATION:      

Left  arm.

                       

 

FINDINGS:     

There is nonocclusive deep venous embolus is noted in the left jugular vein. There is occlusive throm
bus seen in the left cephalic vein distally. The subclavian vein, axillary vein and brachial veins ar
e patent. No evidence of obstruction. The radial and ulnar veins are patent.

 

CONCLUSION:     

1. Nonocclusive deep venous embolus is involving the left jugular vein.

2. Occlusive thrombus is noted the left cephalic vein distally.

 

 

 

 Johny Dwyer MD on March 20, 2018 at 12:36           

Board Certified Radiologist.

 This report was verified electronically.

## 2018-03-20 NOTE — HHI.PR
Subjective


Remarks


F/U colitis. Still with loose stools. Requesting home pain meds dw RN





Objective


Vitals





Vital Signs








  Date Time  Temp Pulse Resp B/P (MAP) Pulse Ox O2 Delivery O2 Flow Rate FiO2


 


3/20/18 08:00 98.9 92 22 141/90 (107) 98   


 


3/20/18 04:00 99.9 73 27 180/87 (118) 98   


 


3/20/18 04:00 99.9 73 27 180/87 (118) 98   


 


3/20/18 00:00 99.5 92 13 146/61 (89) 96   


 


3/19/18 20:00     96 Nasal Cannula 2.00 


 


3/19/18 20:00 101.0 92 22 195/89 (124) 96   


 


3/19/18 19:34        21


 


3/19/18 16:00 98.7 88 22 159/89 (112) 98   


 


3/19/18 12:33  84      


 


3/19/18 12:00 99.9 89 22 150/75 (100) 94   














I/O      


 


 3/19/18 3/19/18 3/19/18 3/20/18 3/20/18 3/20/18





 07:00 15:00 23:00 07:00 15:00 23:00


 


Intake Total  100 ml 1720 ml 240 ml  


 


Output Total 730 ml 400 ml 1500 ml 1900 ml  


 


Balance -730 ml -300 ml 220 ml -1660 ml  


 


      


 


Intake Oral   720 ml 240 ml  


 


IV Total  100 ml 1000 ml   


 


Output Urine Total 700 ml 350 ml 1450 ml 1800 ml  


 


Drainage Total 30 ml 50 ml 50 ml 100 ml  


 


# Bowel Movements    1  








Result Diagram:  


3/19/18 0452                                                                   

             3/19/18 0452





Imaging





Last Impressions








Upper Extremity Ultrasound 3/20/18 0000 Signed





Impressions: 





 Service Date/Time:  Tuesday, March 20, 2018 11:18 - CONCLUSION:  1. 

Nonocclusive 





 deep venous embolus is involving the left jugular vein. 2. Occlusive thrombus 

is 





 noted the left cephalic vein distally.     Johny Dwyer MD 


 


Neck Magnetic Resonance Angiography 3/18/18 0000 Signed





Impressions: 





 Service Date/Time:  Sunday, March 18, 2018 11:54 - CONCLUSION:  1. There is 





 focal high-grade stenosis in the proximal right common carotid just above its 





 origin from the innominate. This would be readily amenable to stent placement 





 for treatment. 2. Minimal stenosis at the origin of the right internal carotid 





 at the bifurcation. 3. Probable previous left carotid endarterectomy. 4. Mild 





 stenosis at the origin of the left vertebral.     Iván Pratt MD 


 


Head Magnetic Resonance Angiography 3/18/18 0000 Signed





Impressions: 





 Service Date/Time:  Sunday, March 18, 2018 11:54 - CONCLUSION: There is a 





 decrease caliber of the right anterior cerebral artery (A1 segment) extending 





 from the branch point of the right internal cerebral artery and middle artery, 





 however, the artery appears patent.     Ara Kerr MD 


 


Head CT 3/18/18 0000 Signed





Impressions: 





 Service Date/Time:  Sunday, March 18, 2018 08:35 - CONCLUSION: Focal areas of 





 encephalomalacia involving the right parietal lobe and right occipital lobe 





 consistent with old or subacute infarct. No evidence of adjacent edema. No 





 evidence of acute abnormality.     Ara Kerr MD 


 


Carotid Artery Ultrasound 3/18/18 0000 Signed





Impressions: 





 Service Date/Time:  Sunday, March 18, 2018 12:52 - CONCLUSION:  1. Diffuse 





 calcified atherosclerotic plaque with some degree of soft plaque present at 

the 





 carotid bifurcations. 2. No hemodynamically significant carotid stenosis 

evident 





 by velocity measurements. 3. Antegrade flow in both vertebral arteries.     





 Iván Pratt MD 


 


Brain MRI 3/18/18 0000 Signed





Impressions: 





 Service Date/Time:  Sunday, March 18, 2018 11:54 - CONCLUSION:  1. Patchy 

areas 





 of cortical infarct involving the right parietal and portions of the occipital 





 cortex. No evidence of hemorrhage within this. No significant mass effect is 





 identified.     Iván Pratt MD 


 


Abdomen/Pelvis CT 3/15/18 0129 Signed





Impressions: 





 Service Date/Time:  Thursday, March 15, 2018 02:14 - CONCLUSION:  1. Acute 





 inflammatory process extending from the distal transverse colon to the distal 





 sigmoid colon. No significant diverticuli. This could relate to ischemia 

colitis 





 or infectious colitis. 2. No abscess or free air. 3. Well-distended 

gallbladder 





 without calcified gallstones, gallbladder wall thickening, or pericholecystic 





 fluid.     Wil Blue Jr., MD 








Objective Remarks


GENERAL: Well-developed and well-nourished in no distress 


SKIN: Warm and dry.


CARDIOVASCULAR: Regular rate and rhythm.  S1 and S2


RESPIRATORY: No accessory muscle use. Clear to auscultation. Breath sounds 

equal bilaterally. 


GASTROINTESTINAL: Abdomen soft, mild right lower quadrant tenderness with drain 

in place, nondistended.


MUSCULOSKELETAL: Extremities without clubbing, cyanosis and edema. No obvious 

deformities. 


NEUROLOGICAL: Awake and alert. No obvious cranial nerve deficits.  Motor 

grossly within normal limits except for flaccid left upper extremity. LLE also 

weak but able to raise vs gravity normal speech.


PSYCHIATRIC: Appropriate mood and affect; insight and judgment normal.


Procedures


Echocardiogram, diagnostic laparoscopic syncope with washout and drain placement

, procedure Date:


Mar 15, 2018


Pre Op Diagnosis:  


acute abdomen, lactic acidosis, colonic ischemia


Post Op Diagnosis:  


acute abdomen, ascities


Surgeon:


Diomedes Blanco MD


Assistant(s):


none


Procedure:


dx lap, with washout, drain placement


Findings:


ascities, viable colon


Complications:


none


Specimen(s) removed:


none


Estimated blood loss:


5cc


Anesthesia:  General


Drains:  ANDREI


Patient to:  PACU


Patient Condition:  Diomedes Bird MD





A/P


Problem List:  


(1) Acute right MCA stroke


ICD Code:  I63.511 - Cerebral infarction due to unspecified occlusion or 

stenosis of right middle cerebral artery


Status:  Acute


(2) Colitis, acute


ICD Code:  K52.9 - Noninfective gastroenteritis and colitis, unspecified; 

R65.20 - Severe sepsis without septic shock


Status:  Acute


Assessment and Plan


NEURO:


Chronic pain


Insomnia


Oxycodone and IV Dilaudid as needed 


Acute CVA with left sided weakness.  Continue permissive hypertension, aspirin 

and statin.  Echocardiogram shows EF of 50%.  Risk factor modification, tobacco 

cessation.  A1c 5.1.  Physical therapy, OT and speech therapy evaluation.  

Neurology consulted. Rt\t carotid eval for cea/stent in a few weeks once he has 

healed from abdominal surgery and recovered from left hemiparesis








RESP:


Tobacco abuse


On room air.  Albuterol every 2 hours as needed for wheezing





CV: 


Stable


Prior medical records states he is on Cardizem.  Unclear patient was still 

taking this.  He states he is on an unknown antihypertensive medication.





GI:


Likely ischemic colitis discussed with general surgery status post diagnostic 

laparoscopy, washout and drain placement


Culture positive for Giardia.  Patient clinically stable with no more nausea, 

vomiting diarrhea


Advance diet per general surgery.  Wound care.  De-escalated antibiotic ct 

Flagyl for a total of 7 days





FEN/RENAL: 


Acute kidney injury.  Improved


Received 1 L NS in ED.  ml/hr will decrease to 70 cc an hour. 


Monitor I/O closely and follow-up BMP.  Discontinue Barber catheter 





ID:


Severe Sepsis 


Colitis - ?Ischemic


Leukocytosis.  Resolved


New onset fever. Looks good. Will monitor


Cultures negative to date except for Giardia.  De-escalate antibiotics as 

previously mentioned.  








DVT prophylaxis with SCD and early ambulation.  Pharmacological prophylaxis if 

okay with GS and neurology.


Discharge Planning


Possible transfer to [place] when cleared by neurology and general surgery











Mendoza South MD Mar 20, 2018 10:15

## 2018-03-20 NOTE — HHI.GIFU
Subjective


Remarks


Pt OOB to chair.  his has some abd soreness.  diarrhea improved.


 (Candice Gustafson)





Objective


Vitals I&O





Vital Signs








  Date Time  Temp Pulse Resp B/P (MAP) Pulse Ox O2 Delivery O2 Flow Rate FiO2


 


3/20/18 12:00 99.0 100 23 143/83 (103) 96   


 


3/20/18 08:20     98  2.00 


 


3/20/18 08:00 98.9 92 22 141/90 (107) 98   


 


3/20/18 04:00 99.9 73 27 180/87 (118) 98   


 


3/20/18 04:00 99.9 73 27 180/87 (118) 98   


 


3/20/18 00:00 99.5 92 13 146/61 (89) 96   


 


3/19/18 20:00     96 Nasal Cannula 2.00 


 


3/19/18 20:00 101.0 92 22 195/89 (124) 96   


 


3/19/18 19:34        21


 


3/19/18 16:00 98.7 88 22 159/89 (112) 98   














I/O      


 


 3/19/18 3/19/18 3/19/18 3/20/18 3/20/18 3/20/18





 07:00 15:00 23:00 07:00 15:00 23:00


 


Intake Total  100 ml 1720 ml 240 ml  


 


Output Total 730 ml 400 ml 1500 ml 1900 ml 20 ml 


 


Balance -730 ml -300 ml 220 ml -1660 ml -20 ml 


 


      


 


Intake Oral   720 ml 240 ml  


 


IV Total  100 ml 1000 ml   


 


Output Urine Total 700 ml 350 ml 1450 ml 1800 ml  


 


Drainage Total 30 ml 50 ml 50 ml 100 ml 20 ml 


 


# Bowel Movements    1  








Laboratory





Laboratory Tests








Test


  3/20/18


09:40


 


White Blood Count 10.8 


 


Red Blood Count 4.29 


 


Hemoglobin 13.6 


 


Hematocrit 39.6 


 


Mean Corpuscular Volume 92.5 


 


Mean Corpuscular Hemoglobin 31.7 


 


Mean Corpuscular Hemoglobin


Concent 34.3 


 


 


Red Cell Distribution Width 14.5 


 


Platelet Count 253 


 


Mean Platelet Volume 9.5 


 


Neutrophils (%) (Auto) 70.7 


 


Lymphocytes (%) (Auto) 13.4 


 


Monocytes (%) (Auto) 14.7 


 


Eosinophils (%) (Auto) 0.8 


 


Basophils (%) (Auto) 0.4 


 


Neutrophils # (Auto) 7.7 


 


Lymphocytes # (Auto) 1.5 


 


Monocytes # (Auto) 1.6 


 


Eosinophils # (Auto) 0.1 


 


Basophils # (Auto) 0.0 


 


CBC Comment DIFF FINAL 


 


Differential Comment  


 


Blood Urea Nitrogen 10 


 


Creatinine 0.75 


 


Random Glucose 115 


 


Calcium Level 8.6 


 


Magnesium Level 1.8 


 


Sodium Level 136 


 


Potassium Level 3.2 


 


Chloride Level 97 


 


Carbon Dioxide Level 30.8 


 


Anion Gap 8 


 


Estimat Glomerular Filtration


Rate 107 


 














 Date/Time


Source Procedure


Growth Status


 


 


 3/15/18 02:40


Blood Peripheral Aerobic Blood Culture - Final


NO GROWTH IN 5 DAYS Complete


 


 3/15/18 02:40


Blood Peripheral Anaerobic Blood Culture - Final


NO GROWTH IN 5 DAYS Complete





 3/15/18 12:31


Stool Stool Cryptosporidium Exam - Final


NEGATIVE - NO CRYPTOSPORIDIUM ANTIGEN... Complete


 


 3/15/18 12:31 Giardia Antigen (JODI) - Final


Positive For Giardia Antigen Complete








Imaging





Last Impressions








Upper Extremity Ultrasound 3/20/18 0000 Signed





Impressions: 





 Service Date/Time:  Tuesday, March 20, 2018 11:18 - CONCLUSION:  1. 

Nonocclusive 





 deep venous embolus is involving the left jugular vein. 2. Occlusive thrombus 

is 





 noted the left cephalic vein distally.     Johny Dwyer MD 


 


Neck Magnetic Resonance Angiography 3/18/18 0000 Signed





Impressions: 





 Service Date/Time:  Sunday, March 18, 2018 11:54 - CONCLUSION:  1. There is 





 focal high-grade stenosis in the proximal right common carotid just above its 





 origin from the innominate. This would be readily amenable to stent placement 





 for treatment. 2. Minimal stenosis at the origin of the right internal carotid 





 at the bifurcation. 3. Probable previous left carotid endarterectomy. 4. Mild 





 stenosis at the origin of the left vertebral.     Iván Pratt MD 


 


Head Magnetic Resonance Angiography 3/18/18 0000 Signed





Impressions: 





 Service Date/Time:  Sunday, March 18, 2018 11:54 - CONCLUSION: There is a 





 decrease caliber of the right anterior cerebral artery (A1 segment) extending 





 from the branch point of the right internal cerebral artery and middle artery, 





 however, the artery appears patent.     Ara Kerr MD 


 


Head CT 3/18/18 0000 Signed





Impressions: 





 Service Date/Time:  Sunday, March 18, 2018 08:35 - CONCLUSION: Focal areas of 





 encephalomalacia involving the right parietal lobe and right occipital lobe 





 consistent with old or subacute infarct. No evidence of adjacent edema. No 





 evidence of acute abnormality.     Ara Kerr MD 


 


Carotid Artery Ultrasound 3/18/18 0000 Signed





Impressions: 





 Service Date/Time:  Sunday, March 18, 2018 12:52 - CONCLUSION:  1. Diffuse 





 calcified atherosclerotic plaque with some degree of soft plaque present at 

the 





 carotid bifurcations. 2. No hemodynamically significant carotid stenosis 

evident 





 by velocity measurements. 3. Antegrade flow in both vertebral arteries.     





 Iván Pratt MD 


 


Brain MRI 3/18/18 0000 Signed





Impressions: 





 Service Date/Time:  Sunday, March 18, 2018 11:54 - CONCLUSION:  1. Patchy 

areas 





 of cortical infarct involving the right parietal and portions of the occipital 





 cortex. No evidence of hemorrhage within this. No significant mass effect is 





 identified.     Iván Pratt MD 


 


Abdomen/Pelvis CT 3/15/18 0129 Signed





Impressions: 





 Service Date/Time:  Thursday, March 15, 2018 02:14 - CONCLUSION:  1. Acute 





 inflammatory process extending from the distal transverse colon to the distal 





 sigmoid colon. No significant diverticuli. This could relate to ischemia 

colitis 





 or infectious colitis. 2. No abscess or free air. 3. Well-distended 

gallbladder 





 without calcified gallstones, gallbladder wall thickening, or pericholecystic 





 fluid.     Wil Blue Jr., MD 








Physical Exam


HEENT:   normocephalic; atraumatic; no jaundice.  


CHEST:  CTA


CARDIAC: RRR 


ABDOMEN:  Soft, mildly distended, mild abd TTP diffusely; no nausea vomiting; 

bowel sounds are present in all four quadrants.  serous fluid drain


EXTREMITIES: No clubbing, cyanosis, or edema.


SKIN:  Normal; no rash; no jaundice.


CNS:  AO X 3


 (Candice Gustafson ARNP)





Assessment and Plan


Plan


ASSESSMENT


- n/v, abd pain, diarrhea, questionable blood in stool - onset 3 days ago.  

acute colitis seen on CT, 


   infectious vs ischemic. HH WNL at this time.  distended and tender but does 

not appear to have acute abd at this time


   thinks he saw blood in his stool and some "dark" stool.  use to take NSAIDS 

frequently but quit few months ago d/t stomach pain


- leukocytosis - wbc 28.6   on cefepime, flagyl, diflucan


3/16/18  s/p diag lap and drain placement yesterday, bowel viable.  pt feels 

better today.  c diff neg.  diarrhea improved today.


  stool studies pending. 


03/17/18, minimal amount of drain via right mid to lower quadrant drainage 

system, denies any nausea or vomiting.


, Abdominal CT scan done on 3/15/18  shows acute inflammatory process in the 

distal transverse colon to the distal sigmoid colon.  No diverticuli , well 

distended gallbladder without calcified gallstones.  Hemoglobin stable at 11.9


3/19/18  resting in bed.  No BM.  + flatus.  some pain around drain site


3/20/18 c/o abd soreness.  no further diarrhea. + BM.  WBC now WNL





PLAN 


- diet per GS


-IV Pepcid


-Flagyl


-Zofran


- f/u with GI after d/c


- colonoscopy outpt


-  GI will sign off.  please reconsult if needed


pt seen by myself and DR Wong and this note is on her behalf


 (Candice Gustafson)











Candice Gustafson Mar 20, 2018 13:50


[place]bania Wong MD Mar 20, 2018 16:18

## 2018-03-21 NOTE — HHI.PR
Review/Management


Diagnosis/Plan:  


(1) Acute right MCA stroke


ICD Codes:  I63.511 - Cerebral infarction due to unspecified occlusion or 

stenosis of right middle cerebral artery


Status:  Acute


Plan:  subacute stroke


possibly 2/2 rt carotid stenosis 2/2 chronic tob use





likely occurred prior to operation


ef 50-55%





carotid u/s- no hemodynamically significant stenosis





recs


neuro stable


rehab, ? [facility] if accepted








tobacco cessation


chantix





rehab





rt carotid eval for cea/stent in a few weeks once he has healed from abdominal 

surgery and recovered from left hemiparesis





(2) Stenosis of right internal carotid artery


ICD Codes:  I65.21 - Occlusion and stenosis of right carotid artery


Status:  Chronic


(3) Tobacco abuse


ICD Codes:  Z72.0 - Tobacco use


Status:  Chronic


Plan:  cessation encouraged





(4) Colitis, acute


ICD Codes:  K52.9 - Noninfective gastroenteritis and colitis, unspecified; 

R65.20 - Severe sepsis without septic shock


Status:  Acute





Subjective


Subjective Comments


No acute events reported


No headache


No chest pain


No dyspnea


Active Medications





Current Medications








 Medications


  (Trade)  Dose


 Ordered  Sig/Daniel


 Route  Start Time


 Stop Time Status Last Admin


 


 Lactated Ringer's  1,000 ml @ 


 70 mls/hr  [id number]


 IV  3/15/18 05:00


    3/20/18 21:52


 


 


  (NS Flush)  2 ml  UNSCH  PRN


 IV FLUSH  3/15/18 06:00


    3/19/18 04:49


 


 


  (NS Flush)  2 ml  BID


 IV FLUSH  3/15/18 09:00


    3/20/18 21:42


 


 


  (Tylenol)  650 mg  Q6H  PRN


 PO  3/15/18 06:00


    3/19/18 09:09


 


 


  (Zofran Inj)  4 mg  Q6H  PRN


 IV PUSH  3/15/18 06:00


    3/15/18 08:51


 


 


  (Albuterol Neb)  2.5 mg  Q2HR NEB  PRN


 INH  3/15/18 06:00


     


 


 


 Miscellaneous


 Information  1  [id number]


 XX  3/15/18 06:00


     


 


 


  (Chlorhexidine


 2% Cloth)  


 Taper  DAILY@04


 TOP  3/16/18 04:00


 3/12/19 03:59   


 


 


  (Chlorhexidine


 2% Cloth)  3 pack  UNSCH  PRN


 TOP  3/15/18 06:00


     


 


 


  (Lyrica)  75 mg  BID


 PO  3/15/18 21:00


    3/20/18 21:40


 


 


  (Dilaudid Pf Inj)  1 mg  Q3H  PRN


 IV PUSH  3/15/18 15:45


    3/20/18 09:11


 


 


  (Ativan Inj)  1 mg  Q4H  PRN


 IV PUSH  3/15/18 15:45


    3/21/18 06:33


 


 


  (Lipitor)  10 mg  HS


 PO  3/18/18 21:00


    3/20/18 21:40


 


 


  (NovoLOG


 SUPPLEMENTAL


 SCALE)  1  ACHS


 SQ  3/18/18 12:00


    3/20/18 21:43


 


 


  (D50w (Vial) Inj)  50 ml  UNSCH  PRN


 IV PUSH  3/18/18 09:45


     


 


 


  (Glucagon Inj)  1 mg  UNSCH  PRN


 OTHER  3/18/18 09:45


     


 


 


  (Aspirin Chew)  325 mg  DAILY


 PO  3/19/18 09:00


    3/20/18 09:17


 


 


  (Pepcid)  20 mg  BID


 PO  3/19/18 09:00


    3/20/18 21:42


 


 


  (Chantix)  0.5 mg  Q12HR


 PO  3/19/18 10:00


    3/20/18 21:40


 


 


  (Vasotec Inj)  1.25 mg  Q4H  PRN


 IV PUSH  3/19/18 10:30


     


 


 


  (Flagyl)  500 mg  BID


 PO  3/19/18 21:00


 3/22/18 12:44  3/20/18 21:40


 


 


  (Roxicodone)  5 mg  Q4H  PRN


 PO  3/20/18 10:30


     


 


 


  (Roxicodone)  10 mg  Q4H  PRN


 PO  3/20/18 10:30


    3/20/18 21:40


 


 


  (Heparin Inj)  5,000 units  Q12HR


 SQ  3/20/18 21:00


   UNV  


 








Allergies





Allergies


Coded Allergies


  codeine (Unverified Allergy, Severe, 3/15/18)





Review of Systems


All other ROS:  ROS reviewed as documented in chart





Exam


I&O / VS





Vital Signs








  Date Time  Temp Pulse Resp B/P (MAP) Pulse Ox O2 Delivery O2 Flow Rate FiO2


 


3/21/18 06:00  92  150/85 (106)    


 


3/21/18 05:45  90  146/78 (100) 95   


 


3/21/18 05:30  92  138/72 (94)    


 


3/21/18 05:15  96  137/72 (93)    


 


3/21/18 05:00  98  145/75 (98)    


 


3/21/18 04:55 98.1 109 20 159/87 (111) 94   


 


3/21/18 00:00 98.9 83 17 119/64 (82) 96   


 


3/20/18 23:39      Nasal Cannula 2.00 


 


3/20/18 20:00 99.2 82 15 182/89 (120) 96   


 


3/20/18 16:00 99.0 78 18 146/74 (98) 99   


 


3/20/18 12:00     96 Nasal Cannula 2.00 


 


3/20/18 12:00 99.0 100 23 143/83 (103) 96   


 


3/20/18 08:20     98  2.00 


 


3/20/18 08:00 98.9 92 22 141/90 (107) 98   








General:  Alert and Oriented, No acute distress


Eye:  EOMI, Normal conjuctiva


Respiratory:  Non-labored respirations


Cardiology:  Normal rate


Neurologic:  Alert, Oriented


Psychiatric:  Cooperative


Exam Comments


ox 3, follows,  vff, reduced left nlf, left hemiparesis 3-4/5 arm, 4/5leg, mild 

left hemisensory





Objective


Micro and Labs





Laboratory Tests








Test


  3/20/18


09:40 3/21/18


05:42


 


White Blood Count 10.8  


 


Red Blood Count 4.29  


 


Hemoglobin 13.6  


 


Hematocrit 39.6  


 


Mean Corpuscular Volume 92.5  


 


Mean Corpuscular Hemoglobin 31.7  


 


Mean Corpuscular Hemoglobin


Concent 34.3 


  


 


 


Red Cell Distribution Width 14.5  


 


Platelet Count 253  


 


Mean Platelet Volume 9.5  


 


Neutrophils (%) (Auto) 70.7  


 


Lymphocytes (%) (Auto) 13.4  


 


Monocytes (%) (Auto) 14.7  


 


Eosinophils (%) (Auto) 0.8  


 


Basophils (%) (Auto) 0.4  


 


Neutrophils # (Auto) 7.7  


 


Lymphocytes # (Auto) 1.5  


 


Monocytes # (Auto) 1.6  


 


Eosinophils # (Auto) 0.1  


 


Basophils # (Auto) 0.0  


 


CBC Comment DIFF FINAL  


 


Differential Comment   


 


Blood Urea Nitrogen 10  


 


Creatinine 0.75  


 


Random Glucose 115  


 


Calcium Level 8.6  


 


Magnesium Level 1.8  


 


Sodium Level 136  


 


Potassium Level 3.2  


 


Chloride Level 97  


 


Carbon Dioxide Level 30.8  


 


Anion Gap 8  


 


Estimat Glomerular Filtration


Rate 107 


  


 














 Date/Time


Source Procedure


Growth Status


 


 


 3/15/18 02:40


Blood Peripheral Aerobic Blood Culture - Final


NO GROWTH IN 5 DAYS Complete


 


 3/15/18 02:40


Blood Peripheral Anaerobic Blood Culture - Final


NO GROWTH IN 5 DAYS Complete





 3/15/18 12:31


Stool Stool Cryptosporidium Exam - Final


NEGATIVE - NO CRYPTOSPORIDIUM ANTIGEN... Complete


 


 3/15/18 12:31 Giardia Antigen (JODI) - Final


Positive For Giardia Antigen Complete

















George Wallace MD Mar 21, 2018 07:19

## 2018-03-21 NOTE — PD.CONS
Consult


Service


Palliative Care


.


Consult Requested By


Dr. South


.


Primary Care Physician


Keenan Tom MD


.


Reason for Consultation


   a.  To assist with evaluation and management of symptoms including: pain; 

diarrhea; nausea; left hemiparesis


   b.  To assist medical decision maker(s) with: better understanding of 

current medical conditions; weighing benefits/burdens of medical treatment 

options; making  medical treatment decisions.


.





HPI


History of Present Illness


Mr. Quezada is a 57-year-old male long-term tobacco smoker with a known history 

of hypertension; chronic pain syndrome; and anxiety who presented to the 

emergency department at [facility] on 03/15/2018 complaining of a 3 day 

period of abdominal pain focused mostly in the left lower quadrant and 

accompanied by blackish diarrhea.  The pain had intensified over the last 24 

hours and at time of presentation was rated as 9/10.  The patient also is 

experiencing multiple episodes of vomiting and at one point thought he may have 

seen some blood in the emesis.  He ended up in the emergency department after 

his friend, Janel, found him in the house listless in bed with large amounts of 

dark stool on the floor of the bathroom.  Mr. Quezada, who has chronic pain and 

has been on chronic opiates, had also been wondering if some of his symptoms 

were due to opioid withdrawal since he had run out of his oxycodone a few days 

earlier.





Mr. Quezada reports he has some type of congenital bone/cartilage disorder .  He 

underwent bilateral hip replacements in  and .  He also reports that 

the disorder impacts some lumbar vertebrae.  The syndrome has resulted in 

chronic pain primarily in his hips and in his low back.  His normal regimen 

includes:


* Oxycodone immediate release 15 mg tablet; one by mouth 4 times daily


* Diazepam 10 mg tablet 1 by mouth twice daily


* Amitriptyline 25 mg; one by mouth nightly


* Lyrica 75 mg 1 by mouth every morning





The patient reports he was tested for HIV shortly after the death of his male 

partner about 4 years ago.  He was negative at that time and reports that he 

has not been sexually active since then.





He denies any other history of gastrointestinal problems.  His appetite had 

been good and he had not been losing weight prior to these recent symptoms.








Vital signs in the emergency department were as follows: Temperature 97.6; 

pulse 110; respiratory rate 18; blood pressure 131/74; pulse oximetry 98% on 

room air


Initial examination by the emergency department physician noted the following: 

There was generalized abdominal tenderness with guarding.  The exam was 

otherwise unremarkable.








Initial diagnostic testing revealed the following:


* CBC showed WBC 28.6; hemoglobin 16.3; platelet count 226 there were 20% band 

neutrophils


* Chemistry profile showed sodium 140; potassium 4.5; chloride 102; CO2 23.5; 

anion gap 15; GFR 32; calcium 8.9; BUN 31; creatinine 2.15; glucose 102; lactic 

acid level 6.5


* LFTs showed total bilirubin 0.8; AST 31; ALT 16; alkaline phosphatase 69; 

total protein 7.5; albumin 3.2


* CT of the abdomen/pelvis showed an acute inflammatory process extending from 

the distal transverse colon to the distal sigmoid colon.  No significant 

diverticuli were seen.  The radiologist felt this could be either ischemia or 

infection.  There is no abscess or free air noted.  Gallbladder appeared normal.





The patient was empirically started on IV antibiotics including Zosyn and 

vancomycin.  Fluid resuscitation was begun.  General surgery, gastroenterology,

  and critical care were consulted.





Surgery felt the patient had an acute abdomen.  The patient was taken to the 

operating room and underwent diagnostic laparoscopy with washout and drain 

placement.  The patient had darkish colored ascites but no obvious ischemic 

bowel or other distinct pathology.  A Alexis-Jo drain was placed.





Clostridium difficile studies came back negative.  Giardia studies, however, 

came back positive.





On 18, it was noted that the patient was unable to move his left upper 

extremity well.  MRI revealed patchy areas of cortical infarct involving the 

right parietal and portions of the occipital cortex.  Neck MRA showed focal high

-grade stenosis in the proximal right common carotid just above the origin from 

the innominate vein.





Neurology was consulted.  Dr. Wallace felt the patient had suffered a right MCA 

stroke most likely due to right-sided carotid stenosis.  Mr. Quezada has 

regained some left sided strength and coordination but remains distinctly weak 

on that side.  Speech therapy noted the following earlier today :





* A:  PT WITH MODERATE TO SEVERE COGNITIVE DEFICITS, WITH LEFT  NEGLECT NOTED 

DURING CLOCK DRAWING  AND SYMBOL CANCELLATION





* P:  PATIENT PRESENTS WITH  MODERATE TO SEVERE SPEECH/COGNITIVE DEFICITS.  

PATIENT EXHIBITED REDUCED MEMORY AND LANGUAGE SKILLS WHICH ARE ESSENTIAL FOR 

EVERYDAY PROBLEM SOLVING AND SUCCESSFUL COMPLETION OF GOAL ORIENTED TASKS, 

REDUCED WORKING MEMORY SKILLS NEEDED FOR NEW LEARNING INCLUDING NATURALLY 

OCCURRING STIMULI AND THOSE USED IN A REHABILITATION PROGRAM.  PATIENT COULD 

BENEFIT FROM SKILLED SPEECH THERAPY.  





HIV testing has come back unreactive.  At the time of my visit, the patient 

reports that his diarrhea is improving (1-3 bowel movements per day).  He feels 

his pain has been managed well in the hospital though he frequently reports 

pain levels of 8-10..  He denies nausea.  White blood count has come down to 

normal.  Renal function has returned to normal.





His attending feels he would benefit from time in rehab to regain strength 

prior to going home.  The patient has become insistent on going home to have 

his elderly father care for him.


.


Function/Cognitive Trajectory


Mr. Quezada reports that prior to the current illness his health had been 

stable.  In spite of his orthopedic problems and chronic pain syndrome he 

remained ambulatory without the need for an assistive device.  He was fairly 

sedentary but would take his 2 dogs out for a walk every day.  He would spend 

most of his day watching TV.  He could take care of all have his ADLs.  He 

would do his own grocery shopping.


.





Review of Systems


ROS Limitations:  Clinical Condition


Constitutional:  COMPLAINS OF: Fatigue, Weight loss (With current illness), 

Change in appetite, Pain, Generalized weakness, DENIES: Fever, Weight gain


Eyes:  DENIES: Eye pain, Vision loss, Double Vision


Ears, nose, mouth, throat:  DENIES: Hearing loss, Throat pain, Hoarseness, 

Epistaxis


Respiratory:  DENIES: Apneas, Cough, Snoring, Sputum production, Shortness of 

breath


Cardiovascular:  DENIES: Chest pain, Syncope, Dyspnea on Exertion, Lower 

Extremity Edema


Gastrointestinal:  COMPLAINS OF: Abdominal pain, Black stools, Bloody stools, 

Diarrhea, Nausea, Vomiting, Bloating, Vomiting blood, DENIES: Constipation, 

Difficulty Swallowing


Genitourinary:  DENIES: Urinary frequency, Hematuria, Dysuria


Musculoskeletal:  COMPLAINS OF: Joint pain, Muscle aches, Back pain, DENIES: 

Neck pain


Integumentary:  DENIES: Pruritus, Rash


Hematologic/Lymphatics:  DENIES: Bruising


Immunologic/Allergic:  DENIES: Eczema


Neurologic:  COMPLAINS OF: Abnormal gait, Localized weakness, Poor Balance, 

DENIES: Headache, Seizures


Psychiatric:  COMPLAINS OF: Anxiety, Confusion





Past Family Social History


Coded Allergies:  


     codeine (Unverified  Allergy, Severe, 3/15/18)


Past Medical History


DDD


neuropathy


HTN


chronic pain syndrome (hips and low back)


anxiety


Insomnia


.


Past Surgical History


Bilateral hip arthroplasties (right side on 2002; left side on 2003)


Appendectomy


Tonsillectomy


.


Reported Medications


Prehospitalization medications included the following:





* Oxycodone immediate release, 15 mg tablet, 1 by mouth 4 times daily


* Diazepam 10 mg; one by mouth twice daily


* Pregabalin 75 mg; one by mouth every morning


* Amitriptyline 25 mg tablet; one by mouth at at bedtime


* Diltiazem 120 mg tablets; one by mouth daily


* Pravastatin 20 mg tablet; 1 by mouth daily


.


.





Current Medications








 Medications


  (Trade)  Dose


 Ordered  Sig/Daniel


 Route  Start Time


 Stop Time Status Last Admin


 


  (NS Flush)  2 ml  UNSCH  PRN


 IV FLUSH  3/15/18 06:00


    3/19/18 04:49


 


 


  (NS Flush)  2 ml  BID


 IV FLUSH  3/15/18 09:00


    3/21/18 08:14


 


 


  (Tylenol)  650 mg  Q6H  PRN


 PO  3/15/18 06:00


    3/19/18 09:09


 


 


  (Zofran Inj)  4 mg  Q6H  PRN


 IV PUSH  3/15/18 06:00


    3/15/18 08:51


 


 


  (Albuterol Neb)  2.5 mg  Q2HR NEB  PRN


 INH  3/15/18 06:00


     


 


 


 Miscellaneous


 Information  1  Q361D


 XX  3/15/18 06:00


     


 


 


  (Chlorhexidine


 2% Cloth)  


 Taper  DAILY@04


 TOP  3/16/18 04:00


 3/12/19 03:59   


 


 


  (Chlorhexidine


 2% Cloth)  3 pack  UNSCH  PRN


 TOP  3/15/18 06:00


     


 


 


  (Lyrica)  75 mg  BID


 PO  3/15/18 21:00


    3/21/18 08:13


 


 


  (Dilaudid Pf Inj)  1 mg  Q3H  PRN


 IV PUSH  3/15/18 15:45


    3/20/18 09:11


 


 


  (Ativan Inj)  1 mg  Q4H  PRN


 IV PUSH  3/15/18 15:45


    3/21/18 06:33


 


 


  (Lipitor)  10 mg  HS


 PO  3/18/18 21:00


    3/20/18 21:40


 


 


  (NovoLOG


 SUPPLEMENTAL


 SCALE)  1  ACHS


 SQ  3/18/18 12:00


    3/20/18 21:43


 


 


  (D50w (Vial) Inj)  50 ml  UNSCH  PRN


 IV PUSH  3/18/18 09:45


     


 


 


  (Glucagon Inj)  1 mg  UNSCH  PRN


 OTHER  3/18/18 09:45


     


 


 


  (Aspirin Chew)  325 mg  DAILY


 PO  3/19/18 09:00


    3/21/18 08:14


 


 


  (Pepcid)  20 mg  BID


 PO  3/19/18 09:00


    3/21/18 08:13


 


 


  (Chantix)  0.5 mg  Q12HR


 PO  3/19/18 10:00


    3/21/18 08:13


 


 


  (Vasotec Inj)  1.25 mg  Q4H  PRN


 IV PUSH  3/19/18 10:30


     


 


 


  (Flagyl)  500 mg  BID


 PO  3/19/18 21:00


 3/22/18 12:44  3/21/18 08:13


 


 


  (Roxicodone)  5 mg  Q4H  PRN


 PO  3/20/18 10:30


     


 


 


  (Roxicodone)  10 mg  Q4H  PRN


 PO  3/20/18 10:30


    3/21/18 17:12


 


 


  (Heparin Inj)  5,000 units  Q12HR


 SQ  3/20/18 21:00


   UNV  


 





.


Family History


The patient's father is alive and well and living independently at age 89.


The patient's mother  of unknown type of cancer.


The patient's brother had an ocular tumor and  at age 43


.


Substance Use


Tobacco: Patient has been a 1 pack per day smoker since the fifth grade.


Alcohol: The patient used to be a frequent binge drinker but not a daily 

drinker.  No alcohol since .


Prescription med abuse: Has been using opiates for chronic pain for about 15 

years.  It is unclear if there is ever been abuse.


Illicits: No known use of illicits


.


Psychosocial History


The patient is originally from [place].  He has a high school education.

  He worked primarily as a .  He is currently on disability.





Patient is currently single.  He has 1 child -- Lyudmila --who is in her 20s.  

Per the patient, Lyudmila's mother has prevented any significant contact.  They 

did communicate once last year, however.


Mr. Quezada had a male partner who is currently  from HIV.  That partner 

 about 4 years ago.  The patient has not been sexually active or involved 

in a relationship since that time.  





Mr. Quezada currently lives alone.  His father lives in [facility] and is the patient'

s primary psychosocial support.


.











.


Spiritual/Cultural Factors


Hinduism and spirituality have not been an important part of his life.


.


Living Will:  Completed, but not made available


Health Care Surrogate:  Completed, but not made available


Durable Power of :  Never completed


Date completed:


Patient may have completed an advanced directive for his primary care 

physician.  We do not have access at this time.  Date of this documentation is 

unknown.


.


Health Care Surrogate(s):


At this point, we do not have written designation of a healthcare surrogate.


Documented care wishes:


At this time we do not have written documentation of the patient's goals and 

preferences


.


Today's verbally stated goals:


Patient was clear that he would not want any type of resuscitation effort 

should he have a cardiac or pulmonary arrest.  He continues to desire 

aggressive care short of resuscitation.  He has indicated that he wants his 

father to serve as his healthcare surrogate.


.


Family/friends goals:


Father is present and is in agreement with the patient's stated goals.


.


Ethical and Legal Issues


Patient appears capacitated to make his own healthcare decisions at this time.  

Would recommend, whenever possible, that there will be shared decision making 

with the patient's father.


.





Physical Exam





Vital Signs








  Date Time  Temp Pulse Resp B/P (MAP) Pulse Ox O2 Delivery O2 Flow Rate FiO2


 


3/21/18 12:00 98.5 94 18 163/83 (109) 96   


 


3/21/18 08:00     96 Nasal Cannula 2.00 21


 


3/21/18 08:00 98.8 94 18 170/87 (114) 96   


 


3/21/18 06:00  92  150/85 (106)    


 


3/21/18 05:45  90  146/78 (100) 95   


 


3/21/18 05:30  92  138/72 (94)    


 


3/21/18 05:15  96  137/72 (93)    


 


3/21/18 05:00  98  145/75 (98)    


 


3/21/18 04:55 98.1 109 20 159/87 (111) 94   


 


3/21/18 00:00 98.9 83 17 119/64 (82) 96   


 


3/20/18 23:39      Nasal Cannula 2.00 


 


3/20/18 20:00 99.2 82 15 182/89 (120) 96   





.











 3/21/18 3/22/18





 19:00 07:00


 


Intake Total 1250 ml 


 


Balance 1250 ml 


 


  


 


IV Total 1250 ml 





.


Exam


CONSTITUTIONAL/GENERAL: This is a thin, pale patient, appearing older than his 

stated age in no apparent distress.  He is in a routine med/surgical bed


TUBES/LINES/DRAINS:  n/c 02; peripheral IV; abdominal drain


SKIN: No jaundice, rashes, or lesions.  Surgical wound with a clean, dry, 

dressing in place I did not remove the dressing to examine the wound at this 

time. --There is a Alexis-Jo drain in place with a small amount of 

serosanguineous fluid present.  Skin temperature appropriate. Not diaphoretic. 


HEAD: Atraumatic. Normocephalic.


EYES: Pupils equal and round and reactive. Extraocular motions intact. No 

scleral icterus. No injection or drainage. Fundi not examined.


ENT: Hearing grossly normal. Nose without bleeding or purulent drainage. Throat 

without visible erythema, exudates, masses, or lesions.


NECK: Trachea midline. Supple, nontender. No palpable thyroid enlargement or 

nodularity. 


CARDIOVASCULAR: Regular rate and rhythm without murmurs, gallops, or rubs. No 

JVD. Peripheral pulses symmetric.


RESPIRATORY/CHEST: Symmetric, unlabored respirations.  Diminished breath sounds 

at both bases.    Breath sounds equal bilaterally. No wheezes, rales, or 

rhonchi.  


GASTROINTESTINAL: Abdomen moderately firm.  Drain in place.   No hepato-

splenomegaly, or palpable masses. No guarding. Bowel sounds present.


GENITOURINARY: Without palpable bladder distension.  No penile or scrotal edema.


MUSCULOSKELETAL: Extremities without clubbing, cyanosis, or edema. No joint 

tenderness or effusion noted. No calf tenderness. No mottling.


LYMPHATICS: No palpable cervical or supraclavicular adenopathy.


NEUROLOGICAL: Awake and alert.  Decreased strength and coordination in both 

left extremities.  Follows commands.  Mild cognitive and memory deficits noted.

  Moves all extremities.


PSYCHIATRIC: No obvious anxiety/depression. No apparent hallucinations or other 

psychotic thought process.


.





Diagnostic Tests


Laboratory





Laboratory Tests








Test


  3/19/18


04:52 3/20/18


09:40 3/21/18


05:42


 


White Blood Count


  8.2 TH/MM3


(4.0-11.0) 10.8 TH/MM3


(4.0-11.0) 


 


 


Red Blood Count


  3.90 MIL/MM3


(4.50-5.90) 4.29 MIL/MM3


(4.50-5.90) 


 


 


Hemoglobin


  12.4 GM/DL


(13.0-17.0) 13.6 GM/DL


(13.0-17.0) 


 


 


Hematocrit


  36.1 %


(39.0-51.0) 39.6 %


(39.0-51.0) 


 


 


Mean Corpuscular Volume


  92.6 FL


(80.0-100.0) 92.5 FL


(80.0-100.0) 


 


 


Mean Corpuscular Hemoglobin


  31.9 PG


(27.0-34.0) 31.7 PG


(27.0-34.0) 


 


 


Mean Corpuscular Hemoglobin


Concent 34.5 %


(32.0-36.0) 34.3 %


(32.0-36.0) 


 


 


Red Cell Distribution Width


  14.1 %


(11.6-17.2) 14.5 %


(11.6-17.2) 


 


 


Platelet Count


  148 TH/MM3


(150-450) 253 TH/MM3


(150-450) 


 


 


Mean Platelet Volume


  8.3 FL


(7.0-11.0) 9.5 FL


(7.0-11.0) 


 


 


Neutrophils (%) (Auto)


  67.3 %


(16.0-70.0) 70.7 %


(16.0-70.0) 


 


 


Lymphocytes (%) (Auto)


  13.8 %


(9.0-44.0) 13.4 %


(9.0-44.0) 


 


 


Monocytes (%) (Auto)


  17.1 %


(0.0-8.0) 14.7 %


(0.0-8.0) 


 


 


Eosinophils (%) (Auto)


  1.6 %


(0.0-4.0) 0.8 %


(0.0-4.0) 


 


 


Basophils (%) (Auto)


  0.2 %


(0.0-2.0) 0.4 %


(0.0-2.0) 


 


 


Neutrophils # (Auto)


  5.6 TH/MM3


(1.8-7.7) 7.7 TH/MM3


(1.8-7.7) 


 


 


Lymphocytes # (Auto)


  1.1 TH/MM3


(1.0-4.8) 1.5 TH/MM3


(1.0-4.8) 


 


 


Monocytes # (Auto)


  1.4 TH/MM3


(0-0.9) 1.6 TH/MM3


(0-0.9) 


 


 


Eosinophils # (Auto)


  0.1 TH/MM3


(0-0.4) 0.1 TH/MM3


(0-0.4) 


 


 


Basophils # (Auto)


  0.0 TH/MM3


(0-0.2) 0.0 TH/MM3


(0-0.2) 


 


 


CBC Comment DIFF FINAL  DIFF FINAL  


 


Differential Comment     


 


Blood Urea Nitrogen


  10 MG/DL


(7-18) 10 MG/DL


(7-18) 10 MG/DL


(7-18)


 


Creatinine


  0.76 MG/DL


(0.60-1.30) 0.75 MG/DL


(0.60-1.30) 0.77 MG/DL


(0.60-1.30)


 


Random Glucose


  92 MG/DL


() 115 MG/DL


() 86 MG/DL


()


 


Total Protein


  6.1 GM/DL


(6.4-8.2) 


  


 


 


Albumin


  2.2 GM/DL


(3.4-5.0) 


  


 


 


Calcium Level


  8.1 MG/DL


(8.5-10.1) 8.6 MG/DL


(8.5-10.1) 8.1 MG/DL


(8.5-10.1)


 


Phosphorus Level


  2.2 MG/DL


(2.5-4.9) 


  


 


 


Magnesium Level


  1.7 MG/DL


(1.5-2.5) 1.8 MG/DL


(1.5-2.5) 1.7 MG/DL


(1.5-2.5)


 


Alkaline Phosphatase


  64 U/L


() 


  


 


 


Aspartate Amino Transf


(AST/SGOT) 22 U/L (15-37) 


  


  


 


 


Alanine Aminotransferase


(ALT/SGPT) 13 U/L (12-78) 


  


  


 


 


Total Bilirubin


  0.4 MG/DL


(0.2-1.0) 


  


 


 


Sodium Level


  137 MEQ/L


(136-145) 136 MEQ/L


(136-145) 138 MEQ/L


(136-145)


 


Potassium Level


  3.2 MEQ/L


(3.5-5.1) 3.2 MEQ/L


(3.5-5.1) 3.6 MEQ/L


(3.5-5.1)


 


Chloride Level


  98 MEQ/L


() 97 MEQ/L


() 101 MEQ/L


()


 


Carbon Dioxide Level


  31.8 MEQ/L


(21.0-32.0) 30.8 MEQ/L


(21.0-32.0) 27.6 MEQ/L


(21.0-32.0)


 


Anion Gap 7 MEQ/L (5-15)  8 MEQ/L (5-15)  9 MEQ/L (5-15) 


 


Estimat Glomerular Filtration


Rate 105 ML/MIN


(>89) 107 ML/MIN


(>89) 104 ML/MIN


(>89)


 


Triglycerides Level


  107 MG/DL


() 


  


 


 


Cholesterol Level


  75 MG/DL


(120-200) 


  


 


 


LDL Cholesterol


  39 MG/DL


(0-99) 


  


 


 


HDL Cholesterol


  14.5 MG/DL


(40.0-60.0) 


  


 


 


Cholesterol/HDL Ratio 5.17 RATIO   





.


Result Diagram:  


3/20/18 0940                                                                   

             3/21/18 0542





Microbiology





Microbiology








 Date/Time


Source Procedure


Growth Status


 


 


 3/15/18 02:40


Blood Peripheral Aerobic Blood Culture - Final


NO GROWTH IN 5 DAYS Complete


 


 3/15/18 02:40


Blood Peripheral Anaerobic Blood Culture - Final


NO GROWTH IN 5 DAYS Complete





 3/15/18 12:31


Stool Stool Cryptosporidium Exam - Final


NEGATIVE - NO CRYPTOSPORIDIUM ANTIGEN... Complete


 


 3/15/18 12:31 Giardia Antigen (JODI) - Final


Positive For Giardia Antigen Complete





.


Imaging





Last Impressions








Upper Extremity Ultrasound 3/20/18 0000 Signed





Impressions: 





 Service Date/Time:  2018 11:18 - CONCLUSION:  1. 

Nonocclusive 





 deep venous embolus is involving the left jugular vein. 2. Occlusive thrombus 

is 





 noted the left cephalic vein distally.     Johny Dwyer MD 


 


Neck Magnetic Resonance Angiography 3/18/18 0000 Signed





Impressions: 





 Service Date/Time:  2018 11:54 - CONCLUSION:  1. There is 





 focal high-grade stenosis in the proximal right common carotid just above its 





 origin from the innominate. This would be readily amenable to stent placement 





 for treatment. 2. Minimal stenosis at the origin of the right internal carotid 





 at the bifurcation. 3. Probable previous left carotid endarterectomy. 4. Mild 





 stenosis at the origin of the left vertebral.     Iván Pratt MD 


 


Head Magnetic Resonance Angiography 3/18/18 0000 Signed





Impressions: 





 Service Date/Time:  2018 11:54 - CONCLUSION: There is a 





 decrease caliber of the right anterior cerebral artery (A1 segment) extending 





 from the branch point of the right internal cerebral artery and middle artery, 





 however, the artery appears patent.     Ara Kerr MD 


 


Head CT 3/18/18 0000 Signed





Impressions: 





 Service Date/Time:  2018 08:35 - CONCLUSION: Focal areas of 





 encephalomalacia involving the right parietal lobe and right occipital lobe 





 consistent with old or subacute infarct. No evidence of adjacent edema. No 





 evidence of acute abnormality.     Ara Kerr MD 


 


Carotid Artery Ultrasound 3/18/18 0000 Signed





Impressions: 





 Service Date/Time:  2018 12:52 - CONCLUSION:  1. Diffuse 





 calcified atherosclerotic plaque with some degree of soft plaque present at 

the 





 carotid bifurcations. 2. No hemodynamically significant carotid stenosis 

evident 





 by velocity measurements. 3. Antegrade flow in both vertebral arteries.     





 Iván Pratt MD 


 


Brain MRI 3/18/18 0000 Signed





Impressions: 





 Service Date/Time:  2018 11:54 - CONCLUSION:  1. Patchy 

areas 





 of cortical infarct involving the right parietal and portions of the occipital 





 cortex. No evidence of hemorrhage within this. No significant mass effect is 





 identified.     Iván Pratt MD 


 


Abdomen/Pelvis CT 3/15/18 0129 Signed





Impressions: 





 Service Date/Time:  Thursday, March 15, 2018 02:14 - CONCLUSION:  1. Acute 





 inflammatory process extending from the distal transverse colon to the distal 





 sigmoid colon. No significant diverticuli. This could relate to ischemia 

colitis 





 or infectious colitis. 2. No abscess or free air. 3. Well-distended 

gallbladder 





 without calcified gallstones, gallbladder wall thickening, or pericholecystic 





 fluid.     Wil Blue Jr., MD 





.


Procedures


Diagnostic laparoscopy  03/15/18


.





Patient/Family Conference


Present at Family Conference:


-->Patient and his father.


.


Family Conference Time (mins):  40


Family Conference Location:  Bedside


Issues Discussed:


* Palliative care role, purpose, approach


* Additional medical, psychosocial, and spiritual history


* Patients general health, functional status, and cognitive changes in the 

months leading up to the current hospitalization


* Patient  understanding of the current medical problems


* Patient  understanding of prognosis


* Patients goals of medic-->al treatment.


* Current medical treatment options and benefits/burdens of those options


* Questions answered to the best of my ability


.





Assessment and Plan


Disease Oriented Problem List:  


(1) Colitis, acute


(2) Giardiasis


(3) Severe sepsis


(4) Lactic acidosis


(5) Acute right MCA stroke


(6) Stenosis of right internal carotid artery


(7) Opioid dependence


(8) Chronic pain


(9) NELLIE (acute kidney injury)


(10) Anxiety


Symptom Scale:  


(1) Pain


0-10 Scale:  9


Comment:  Patient has a combination of chronic musculoskeletal pain with 

overlying acute pain from his recent surgery.  Most of his chronic pain has 

been in the hips and low back.  The acute pain is in his abdomen.  He has a 

difficult time describing his pain.  He does tell the nurses that his pain 

levels are between 8 and 10 but at the same time he reports that the pain is 

well managed here in the hospital.


.





(2) Nausea


0-10 Scale:  0


Comment:  Much improved over the course of the hospitalization.





(3) Vomiting


0-10 Scale:  0


Comment:  Much improved over the course of the hospitalization.


.





(4) Diarrhea


0-10 Scale:  Unable to quantify


Comment:  Slowly improving.





Pertinent Non-Medical Issues


Psychosocial: Lives alone.  He has 1 adult daughter who he is essentially out 

of communication with.  Patient's father who lives in the area, is the primary 

caregiver.


Spiritual: Hinduism/spirituality have not played an important role in his life.


Legal: No advance directives on file at this time.  Patient has some mild 

cognitive deficits but I believe he is still capacitated to make his own 

healthcare decisions.  Recommend whenever possible, however, to achieve shared 

decision making with the patient's father.


Ethical issues impacting care: No known ethical issues impact care at this time.





.


Important Contacts


* Song Quezada ( father-->and verbally designated HCS)  --[phone]  


.


Prognosis


Mr. Quezada is 57 years old.  We anticipate his colitis will resolve and he will 

be able to clear himself of Giardia.  Although he is regaining some left sided 

neurological function, it is not clear what percentage he will recover. He has 

no other end stage illness.  His chronic pain, ongoing smoking, and sedentery 

lifestyle place him at risk of complications.  He remains vulnerable to more 

strokes at this time.  





.


Code Status:  No Code


Plan


==  Code Status :  NO CODE    Patient was emphatic and let me know in front of 

his father  on 03/21/18, that he did not want any resuscitative efforts should 

he have any type of cardio-respiratory arrest. 





==  Decision Making:  Patient has mild memory and cognitive deficits but 

appears to have insight into his illness.  I would recommend however, that 

whenever possible, major decisions should be done by shared decision making 

with his father.





==  Goals of medical treatment:  Patient's goals are aggressive short of 

resuscitation.  At this point, one of his major goals is to get home.





== Symptoms:


* Pain:  Patient has a combination of chronic musculoskeletal pain with 

overlying acute pain from his recent surgery.  Most of his chronic pain has 

been in the hips and low back.  The acute pain is in his abdomen.  He has a 

difficult time describing his pain.  He does tell the nurses that his pain 

levels are between 8 and 10 but at the same time he reports that the pain is 

well managed here in the hospital.  Normally, at home, pain is controlled with 

oxycodone immediate release 15 mg tablets; one by mouth 4 times daily.  Patient 

currently here has orders for oral oxycodone and parenteral hydromorphone.  

This regimen appears to be working fine.  No further recommendations at this 

time.


* Nausea: Dramatically improved with treatment of his colitis.


* Vomiting: Dramatically improved with treatment of his colitis


* Diarrhea:  Improving with treatment of the colitis


* Left hemiparesis:  Slowly improving





==  Disposition: Patient appears adamant that he DOES NOT want to go to any 

type of rehab.  Initially, I thought his determination to go directly home 

might have something to do with getting better control over his home pain 

regimen.  It seems, more likely, it has to do with caring for his dogs.  I have 

urged him to postpone going home until he can safely walk from bed to bathroom 

and back.   We talk about the dangers of a fall and what the likely impact of a 

fall would be given his congenital orthopedic problem.  We also discussed the 

challenge it would be for his elderly father to serve as a full-time caregiver 

or even to try and pick him up if he slips to the floor.  It is certainly 

important to continue physical therapy as long as the patient is here in the 

hospital.  Hopefully he will continue to improve rapidly which would allow a 

discharge home with a much lower risk of fall.  If he does get discharged home, 

I would recommend home health to continue with physical therapy in the home.  I 

also encouraged at least a brief stay in rehab to help protect his elderly 

father from mayelin Giardia.





== Patient and  his father were educated  about  Giardia and modes of contagion.





==We discussed advanced directives.  Patient requested to complete a living 

will and healthcare surrogate designation.  I provided him with appropriate 

paperwork and offered guidance.  We will scan this into the electronic medical 

record when completed.





== Palliative care will continue to follow to help assist with symptom 

management and to further clarify goals of medical treatment as the clinical 

course evolves.


.





Thank you for the opportunity to participate in the care of Mr. Quezada.


.





Attestation


To help prompt me to consider important information that might be impacting 

today's encounter and assessment, information from prior notes written by 

myself or my colleagues may have been "brought forward" into today's note.  My 

signature on this note, however, is an attestation that I personally performed 

the exam, history, and/or decision-making noted today, and, unless otherwise 

indicated, the interactions with patient, family, and staff as well as the 

review of records all occurred today.  I also attest that the listed assessment 

and stated plan reflect my best clinical judgment today based on the 

combination of historical information, prior notes, and today's exam/ 

interactions.  When time spent is documented, it refers only to time spent 

today by the signer, or if indicated, combined time spent today by 

collaborating physician/nurse practitioner.


.











Brown Mccauley MD Mar 21, 2018 18:04

## 2018-03-21 NOTE — HHI.DCPOC
Discharge Care Plan


Diagnosis:  


(1) Colitis, acute








Your Health Problems Are: Difficulty with ADL





 Exercise Tolerance








Goals to Promote Your Health


* To prevent worsening of your condition and complications


* To maintain your health at the optimal level


Directions to Meet Your Goals


*** Take your medications as prescribed


*** Follow your dietary instruction


*** Follow activity as directed








*** Keep your appointments as scheduled


*** Take your immunizations and boosters as scheduled


*** If your symptoms worsen call your PCP, if no PCP go to Urgent Care Center 

or Emergency Room***


*** Smoking is Dangerous to Your Health. Avoid second hand smoke***


***Call the 24-hour hour crisis hotline for domestic abuse at 1-586.379.9378***











Mendoza South MD Mar 21, 2018 08:07

## 2018-03-21 NOTE — HHI.PR
Subjective


Remarks


Follow-up colitis and CVA.  He is refusing rehabilitation states he wants to go 

home to the care of his elderly father.  Improving left-sided weakness but 

unable to support himself standing up.  Discussed with nursing





Objective


Vitals





Vital Signs








  Date Time  Temp Pulse Resp B/P (MAP) Pulse Ox O2 Delivery O2 Flow Rate FiO2


 


3/21/18 12:00 98.5 94 18 163/83 (109) 96   


 


3/21/18 08:00     96 Nasal Cannula 2.00 21


 


3/21/18 08:00 98.8 94 18 170/87 (114) 96   


 


3/21/18 06:00  92  150/85 (106)    


 


3/21/18 05:45  90  146/78 (100) 95   


 


3/21/18 05:30  92  138/72 (94)    


 


3/21/18 05:15  96  137/72 (93)    


 


3/21/18 05:00  98  145/75 (98)    


 


3/21/18 04:55 98.1 109 20 159/87 (111) 94   


 


3/21/18 00:00 98.9 83 17 119/64 (82) 96   


 


3/20/18 23:39      Nasal Cannula 2.00 


 


3/20/18 20:00 99.2 82 15 182/89 (120) 96   


 


3/20/18 16:00 99.0 78 18 146/74 (98) 99   














I/O      


 


 3/20/18 3/20/18 3/20/18 3/21/18 3/21/18 3/21/18





 07:00 15:00 23:00 07:00 15:00 23:00


 


Intake Total 240 ml  1600 ml  1250 ml 


 


Output Total 1900 ml 20 ml 1095 ml 1050 ml  


 


Balance -1660 ml -20 ml 505 ml -1050 ml 1250 ml 


 


      


 


Intake Oral 240 ml  600 ml   


 


IV Total   1000 ml  1250 ml 


 


Output Urine Total 1800 ml  1025 ml 1000 ml  


 


Drainage Total 100 ml 20 ml 70 ml 50 ml  


 


# Bowel Movements 1  1 2  








Result Diagram:  


3/20/18 0940                                                                   

             3/21/18 0542





Imaging





Last Impressions








Upper Extremity Ultrasound 3/20/18 0000 Signed





Impressions: 





 Service Date/Time:  Tuesday, March 20, 2018 11:18 - CONCLUSION:  1. 

Nonocclusive 





 deep venous embolus is involving the left jugular vein. 2. Occlusive thrombus 

is 





 noted the left cephalic vein distally.     Johny Dwyer MD 


 


Neck Magnetic Resonance Angiography 3/18/18 0000 Signed





Impressions: 





 Service Date/Time:  Sunday, March 18, 2018 11:54 - CONCLUSION:  1. There is 





 focal high-grade stenosis in the proximal right common carotid just above its 





 origin from the innominate. This would be readily amenable to stent placement 





 for treatment. 2. Minimal stenosis at the origin of the right internal carotid 





 at the bifurcation. 3. Probable previous left carotid endarterectomy. 4. Mild 





 stenosis at the origin of the left vertebral.     Iván Pratt MD 


 


Head Magnetic Resonance Angiography 3/18/18 0000 Signed





Impressions: 





 Service Date/Time:  Sunday, March 18, 2018 11:54 - CONCLUSION: There is a 





 decrease caliber of the right anterior cerebral artery (A1 segment) extending 





 from the branch point of the right internal cerebral artery and middle artery, 





 however, the artery appears patent.     Ara Kerr MD 


 


Head CT 3/18/18 0000 Signed





Impressions: 





 Service Date/Time:  Sunday, March 18, 2018 08:35 - CONCLUSION: Focal areas of 





 encephalomalacia involving the right parietal lobe and right occipital lobe 





 consistent with old or subacute infarct. No evidence of adjacent edema. No 





 evidence of acute abnormality.     Ara Kerr MD 


 


Carotid Artery Ultrasound 3/18/18 0000 Signed





Impressions: 





 Service Date/Time:  Sunday, March 18, 2018 12:52 - CONCLUSION:  1. Diffuse 





 calcified atherosclerotic plaque with some degree of soft plaque present at 

the 





 carotid bifurcations. 2. No hemodynamically significant carotid stenosis 

evident 





 by velocity measurements. 3. Antegrade flow in both vertebral arteries.     





 Iván Pratt MD 


 


Brain MRI 3/18/18 0000 Signed





Impressions: 





 Service Date/Time:  Sunday, March 18, 2018 11:54 - CONCLUSION:  1. Patchy 

areas 





 of cortical infarct involving the right parietal and portions of the occipital 





 cortex. No evidence of hemorrhage within this. No significant mass effect is 





 identified.     Iván Pratt MD 


 


Abdomen/Pelvis CT 3/15/18 0129 Signed





Impressions: 





 Service Date/Time:  Thursday, March 15, 2018 02:14 - CONCLUSION:  1. Acute 





 inflammatory process extending from the distal transverse colon to the distal 





 sigmoid colon. No significant diverticuli. This could relate to ischemia 

colitis 





 or infectious colitis. 2. No abscess or free air. 3. Well-distended 

gallbladder 





 without calcified gallstones, gallbladder wall thickening, or pericholecystic 





 fluid.     Wil Blue Jr., MD 








Objective Remarks


GENERAL: Well-developed and well-nourished in no distress 


SKIN: Warm and dry.


CARDIOVASCULAR: Regular rate and rhythm.  S1 and S2


RESPIRATORY: No accessory muscle use. Clear to auscultation. Breath sounds 

equal bilaterally. 


GASTROINTESTINAL: Abdomen soft, mild right lower quadrant tenderness with drain 

in place, nondistended.


MUSCULOSKELETAL: Extremities without clubbing, cyanosis and edema. No obvious 

deformities. 


NEUROLOGICAL: Awake and alert. No obvious cranial nerve deficits.  Improving 

left-sided weakness with normal speech.


Procedures


Echocardiogram, diagnostic laparoscopic syncope with washout and drain placement

, procedure Date:


Mar 15, 2018


Pre Op Diagnosis:  


acute abdomen, lactic acidosis, colonic ischemia


Post Op Diagnosis:  


acute abdomen, ascities


Surgeon:


Diomedes Blanco MD


Assistant(s):


none


Procedure:


dx lap, with washout, drain placement


Findings:


ascities, viable colon


Complications:


none


Specimen(s) removed:


none


Estimated blood loss:


5cc


Anesthesia:  General


Drains:  ANDREI


Patient to:  PACU


Patient Condition:  Diomedes Bird MD





A/P


Problem List:  


(1) Colitis, acute


ICD Code:  K52.9 - Noninfective gastroenteritis and colitis, unspecified; 

R65.20 - Severe sepsis without septic shock


Status:  Acute


(2) Lactic acidosis


ICD Code:  E87.2 - Acidosis


Status:  Acute


(3) Tobacco abuse


ICD Code:  Z72.0 - Tobacco use


Status:  Chronic


(4) NELLIE (acute kidney injury)


ICD Code:  N17.9 - Acute kidney failure, unspecified


Status:  Acute


(5) Severe sepsis


ICD Code:  A41.9 - Sepsis, unspecified organism; R65.20 - Severe sepsis without 

septic shock


Status:  Acute


(6) Opioid dependence


ICD Code:  F11.20 - Opioid dependence, uncomplicated


Status:  Chronic


(7) Chronic pain


ICD Code:  G89.29 - Other chronic pain


Status:  Chronic


Assessment and Plan


NEURO:


Chronic pain


Insomnia


Oxycodone and IV Dilaudid as needed 


Acute CVA with left sided weakness.  Continue  aspirin and statin.  

Echocardiogram shows EF of 50%.  Risk factor modification, tobacco cessation.  

A1c 5.1.  Physical therapy, OT and speech therapy ff.  PT recommends inpatient 

rehab.  ST recommends  follow-up after the.  Neurology cleared patient for 

discharge to rehab. Rt carotid eval for cea/stent in a few weeks once he has 

healed from abdominal surgery and recovered from left hemiparesis.  He is 

refusing rehabilitation at this time and wants to go home to his elderly father'

s home.  Though he is improving but still weak and unable to support himself.  

He is a fall risk.  We will consult palliative care to clarify goals of care.  

We will also ask for home clearance from physical therapy





RESP:


Tobacco abuse


On room air.  Albuterol every 2 hours as needed for wheezing





CV: 


Stable


Prior medical records states he is on Cardizem.  Unclear patient was still 

taking this.  He states he is on an unknown antihypertensive medication.





GI:


Likely ischemic colitis discussed with general surgery status post diagnostic 

laparoscopy, washout and drain placement


Culture positive for Giardia.  Patient clinically stable with no more nausea, 

vomiting diarrhea


Advance diet per general surgery.  Wound care.  De-escalated antibiotic ct 

Flagyl for a total of 7 days last dose today





FEN/RENAL: 


Acute kidney injury.  Improved


Received 1 L NS in ED.  ml/hr will decrease to 70 cc an hour. 


Monitor I/O closely and follow-up BMP.  Discontinue Barber catheter 





ID:


Severe Sepsis 


Colitis - ?Ischemic


Leukocytosis.  Resolved


New onset fever.  Clinically stable with no recurrence.  Will monitor


Cultures negative to date except for Giardia.  De-escalate antibiotics as 

previously mentioned.  








DVT prophylaxis with SCD and early ambulation.  Pharmacological prophylaxis 

with subcu heparin


Discharge Planning


Needs rehabilitation but patient is refusing.  He wants to be discharged with 

home care PT and visiting nurse.  At this time, I do not feel patient is a safe 

discharge to home.  We will consult palliative care to clarify goals of care 

and requests clearance from PT for home discharge











Mendoza South MD Mar 21, 2018 14:25

## 2018-03-21 NOTE — HHI.PR
__________________________________________________





Subjective


Subjective Notes


Resting in bed 


States he has had some improvement to his LUE 


Wants to walk 


Father at bedside





Objective


Vitals/I&O





Vital Signs








  Date Time  Temp Pulse Resp B/P (MAP) Pulse Ox O2 Delivery O2 Flow Rate FiO2


 


3/21/18 12:00 98.5 94 18 163/83 (109) 96   


 


3/21/18 08:00      Nasal Cannula 2.00 21








Labs





Laboratory Tests








Test


  3/21/18


05:42


 


Blood Urea Nitrogen 10 


 


Creatinine 0.77 


 


Random Glucose 86 


 


Calcium Level 8.1 


 


Magnesium Level 1.7 


 


Sodium Level 138 


 


Potassium Level 3.6 


 


Chloride Level 101 


 


Carbon Dioxide Level 27.6 


 


Anion Gap 9 


 


Estimat Glomerular Filtration


Rate 104 


 














 Date/Time


Source Procedure


Growth Status


 


 


 3/15/18 02:40


Blood Peripheral Aerobic Blood Culture - Final


NO GROWTH IN 5 DAYS Complete


 


 3/15/18 02:40


Blood Peripheral Anaerobic Blood Culture - Final


NO GROWTH IN 5 DAYS Complete





 3/15/18 12:31


Stool Stool Cryptosporidium Exam - Final


NEGATIVE - NO CRYPTOSPORIDIUM ANTIGEN... Complete


 


 3/15/18 12:31 Giardia Antigen (JODI) - Final


Positive For Giardia Antigen Complete








Radiology





Last 48 hours Impressions








Abdomen/Pelvis CT 3/15/18 0129 Signed





Impressions: 





 Service Date/Time:  Thursday, March 15, 2018 02:14 - CONCLUSION:  1. Acute 





 inflammatory process extending from the distal transverse colon to the distal 





 sigmoid colon. No significant diverticuli. This could relate to ischemia 

colitis 





 or infectious colitis. 2. No abscess or free air. 3. Well-distended 

gallbladder 





 without calcified gallstones, gallbladder wall thickening, or pericholecystic 





 fluid.     Wil Blue Jr., MD 








Cardiovascular:  Regular


Lungs:  Clear


Abdomen:  Other (lap sites c/d/i; soft non distended), Post-op tenderness


Narrative Exam


LUE weakness





A/P


Assessment and Plan


57 year old male POD5 dx lap; drain placement 





-Advance to regular diet + Ensure 


-Continue routine ANDREI care and monitoring---will likely DC soon 


-Heparin subq


-Neurology following---continues to have LUE weakness ---seems to slowly be 

improving 


-Okay to de-escalate antibiotics; watch for fevers 


-OOB as tolerated---PT following ---patient states he walks --reports say he is 

unable to fully support himself


-I highly recommend inpatient rehab--- discussed with Marisa MURCIA from [facility] 


-Patient reluctant for rehab but I do not feel home is a safe discharge plan at 

this time due to LUE weakness and deconditioned state





Attending Statement


patient seen at bedside


intraabdominal issues resolving, pain better


rehab planning





Attestation


The exam, history, and the medical decision-making described in the above note 

were completed with the assistance of the mid-level provider. I reviewed and 

agree with the findings presented.  I attest that I had a face-to-face 

encounter with the patient on the same day, and personally performed and 

documented my assessment and findings in the medical record.











Yolanda Dennison/First Ashley BUTLER Mar 21, 2018 15:28


Diomedes Blanco MD Mar 25, 2018 06:32

## 2018-03-22 NOTE — HHI.PR
__________________________________________________





Subjective


Subjective Notes


No issues overnight 


Tolerating regular diet





Objective


Vitals/I&O





Vital Signs








  Date Time  Temp Pulse Resp B/P (MAP) Pulse Ox O2 Delivery O2 Flow Rate FiO2


 


3/22/18 11:01       3.00 


 


3/22/18 08:00 97.5 83 18 169/96 (120) 98   


 


3/21/18 20:22      Nasal Cannula  


 


3/21/18 08:00        21








Labs











 Date/Time


Source Procedure


Growth Status


 


 


 3/15/18 02:40


Blood Peripheral Aerobic Blood Culture - Final


NO GROWTH IN 5 DAYS Complete


 


 3/15/18 02:40


Blood Peripheral Anaerobic Blood Culture - Final


NO GROWTH IN 5 DAYS Complete





 3/15/18 12:31


Stool Stool Cryptosporidium Exam - Final


NEGATIVE - NO CRYPTOSPORIDIUM ANTIGEN... Complete


 


 3/15/18 12:31 Giardia Antigen (JODI) - Final


Positive For Giardia Antigen Complete








Radiology





Last 48 hours Impressions








Abdomen/Pelvis CT 3/15/18 0129 Signed





Impressions: 





 Service Date/Time:  Thursday, March 15, 2018 02:14 - CONCLUSION:  1. Acute 





 inflammatory process extending from the distal transverse colon to the distal 





 sigmoid colon. No significant diverticuli. This could relate to ischemia 

colitis 





 or infectious colitis. 2. No abscess or free air. 3. Well-distended 

gallbladder 





 without calcified gallstones, gallbladder wall thickening, or pericholecystic 





 fluid.     Wil Blue Jr., MD 








Cardiovascular:  Regular


Lungs:  Clear


Abdomen:  Other (lap sites c/d/i; ADILENE with serous fluid )


Extremities:  No edema


Narrative Exam


LUE weakness





A/P


Assessment and Plan


57 year old male POD6 dx lap; drain placement 





-Tolerating regular diet + Ensure 


-DC ADILENE  


-Heparin subq


-Neurology following---continues to have LUE weakness ---seems to slowly be 

improving 


-OOB as tolerated---PT following 


-I highly recommend inpatient rehab





Attending Statement


patient seen at bedside


consider in pt rehab case mgnt for placement


remove adilene,





Attestation


The exam, history, and the medical decision-making described in the above note 

were completed with the assistance of the mid-level provider. I reviewed and 

agree with the findings presented.  I attest that I had a face-to-face 

encounter with the patient on the same day, and personally performed and 

documented my assessment and findings in the medical record.











Yolanda Dennison/First Assist ARNP Mar 22, 2018 12:21


Diomedes Blanco MD Mar 25, 2018 06:36

## 2018-03-22 NOTE — HHI.PR
Objective


Vitals





Vital Signs








  Date Time  Temp Pulse Resp B/P (MAP) Pulse Ox O2 Delivery O2 Flow Rate FiO2


 


3/22/18 04:00 98.0 80 16 125/73 (90) 100   


 


3/22/18 00:00 97.6 112 20 151/78 (102) 99   


 


3/21/18 20:22     98 Nasal Cannula 2.00 


 


3/21/18 20:00      Nasal Cannula 2.00 


 


3/21/18 16:00 97.3 83 18 114/71 (85) 98   


 


3/21/18 12:00 98.5 94 18 163/83 (109) 96   














I/O      


 


 3/21/18 3/21/18 3/21/18 3/22/18 3/22/18 3/22/18





 07:00 15:00 23:00 07:00 15:00 23:00


 


Intake Total  1250 ml 480 ml   


 


Output Total 1050 ml  550 ml 370 ml  


 


Balance -1050 ml 1250 ml -70 ml -370 ml  


 


      


 


Intake Oral   480 ml   


 


IV Total  1250 ml    


 


Output Urine Total 1000 ml  350 ml 300 ml  


 


Drainage Total 50 ml  200 ml 70 ml  


 


# Voids    100  


 


# Bowel Movements 2  1   








Result Diagram:  


3/20/18 0940                                                                   

             3/21/18 0542





Objective Remarks


GENERAL: Well-developed and well-nourished in no distress 


SKIN: Warm and dry.


CARDIOVASCULAR: Regular rate and rhythm.  S1 and S2


RESPIRATORY: No accessory muscle use. Clear to auscultation. Breath sounds 

equal bilaterally. 


GASTROINTESTINAL: Abdomen soft, mild right lower quadrant tenderness with drain 

in place, nondistended.


MUSCULOSKELETAL: Extremities without clubbing, cyanosis and edema. No obvious 

deformities. 


NEUROLOGICAL: Awake and alert. No obvious cranial nerve deficits.  Improving 

left-sided weakness with normal speech.


Procedures


Echocardiogram, diagnostic laparoscopic syncope with washout and drain placement

, procedure Date:


Mar 15, 2018


Pre Op Diagnosis:  


acute abdomen, lactic acidosis, colonic ischemia


Post Op Diagnosis:  


acute abdomen, ascities


Surgeon:


Diomedes Blanco MD


Assistant(s):


none


Procedure:


dx lap, with washout, drain placement


Findings:


ascities, viable colon


Complications:


none


Specimen(s) removed:


none


Estimated blood loss:


5cc


Anesthesia:  General


Drains:  ANDREI


Patient to:  PACU


Patient Condition:  Diomedes Bird MD





A/P


Problem List:  


(1) Colitis, acute


ICD Code:  K52.9 - Noninfective gastroenteritis and colitis, unspecified; 

R65.20 - Severe sepsis without septic shock


Status:  Acute


(2) Lactic acidosis


ICD Code:  E87.2 - Acidosis


Status:  Acute


(3) Tobacco abuse


ICD Code:  Z72.0 - Tobacco use


Status:  Chronic


(4) NELLIE (acute kidney injury)


ICD Code:  N17.9 - Acute kidney failure, unspecified


Status:  Acute


(5) Severe sepsis


ICD Code:  A41.9 - Sepsis, unspecified organism; R65.20 - Severe sepsis without 

septic shock


Status:  Acute


(6) Opioid dependence


ICD Code:  F11.20 - Opioid dependence, uncomplicated


Status:  Chronic


(7) Chronic pain


ICD Code:  G89.29 - Other chronic pain


Status:  Chronic


Assessment and Plan


NEURO:


Chronic pain


Insomnia


Oxycodone and IV Dilaudid as needed 


Acute CVA with left sided weakness.  Continue  aspirin and statin.  

Echocardiogram shows EF of 50%.  Risk factor modification, tobacco cessation.  

A1c 5.1.  Physical therapy, OT and speech therapy ff.  PT recommends inpatient 

rehab.  ST recommends  follow-up after the.  Neurology cleared patient for 

discharge to rehab. Rt carotid eval for cea/stent in a few weeks once he has 

healed from abdominal surgery and recovered from left hemiparesis.  He is 

refusing rehabilitation at this time and wants to go home to his elderly father'

s home.  Though he is improving but still weak and unable to support himself.  

He is a fall risk.  We will consult palliative care to clarify goals of care.  

We will also ask for home clearance from physical therapy





RESP:


Tobacco abuse


On room air.  Albuterol every 2 hours as needed for wheezing





CV: 


Stable


Prior medical records states he is on Cardizem.  Unclear patient was still 

taking this.  He states he is on an unknown antihypertensive medication.





GI:


Likely ischemic colitis discussed with general surgery status post diagnostic 

laparoscopy, washout and drain placement


Culture positive for Giardia.  Patient clinically stable with no more nausea, 

vomiting diarrhea


Advance diet per general surgery.  Wound care.  De-escalated antibiotic ct 

Flagyl for a total of 7 days last dose today





FEN/RENAL: 


Acute kidney injury.  Improved


Received 1 L NS in ED.  ml/hr will decrease to 70 cc an hour. 


Monitor I/O closely and follow-up BMP.  Discontinue Barber catheter 





ID:


Severe Sepsis 


Colitis - ?Ischemic


Leukocytosis.  Resolved


New onset fever.  Clinically stable with no recurrence.  Will monitor


Cultures negative to date except for Giardia.  De-escalate antibiotics as 

previously mentioned.  








DVT prophylaxis with SCD and early ambulation.  Pharmacological prophylaxis 

with subcu heparin


Discharge Planning


Needs rehabilitation but patient is refusing.  He wants to be discharged with 

home care PT and visiting nurse.  At this time, I do not feel patient is a safe 

discharge to home.  We will consult palliative care to clarify goals of care 

and requests clearance from PT for home discharge











Mendoza South MD Mar 22, 2018 08:07

## 2018-03-22 NOTE — HHI.FF
Face to Face Verification


Diagnosis:  


(1) Acute right MCA stroke


Physical Therapy


Order:  Evaluate and Treat, Improve ambulation, Strength and gait training





Speech Therapy


Order: To Improve:  Speech and communication skills, Cognitive skills





Home Health Nursing








Order: Medical education





 Signs/symptoms of disease process





 Oxygen administration education





 Medication education-adverse effect





 Wound care and dressing changes





 Nursing assessment with vital signs

















I have seen patient Gideon Quezada on 3/22/18. My clinical findings 

support the need for the requested home health care services because:








 Ltd mobility - disease progression





 Deconditioned w/ increased weakness














I certify that my clinical findings support that this patient is homebound 

because:








 Unsafe to leave home unassisted

















Mendoza South MD Mar 22, 2018 08:04

## 2018-03-22 NOTE — HHI.DS
__________________________________________________





Discharge Summary


Admission Date


Mar 15, 2018 at 04:29


Discharge Date:  Mar 22, 2018


Admitting Diagnosis





Ischemic Colitis





(1) Colitis, acute


ICD Code:  K52.9 - Noninfective gastroenteritis and colitis, unspecified; 

R65.20 - Severe sepsis without septic shock


Diagnosis:  Principal


Status:  Acute


(2) Lactic acidosis


ICD Code:  E87.2 - Acidosis


Diagnosis:  Secondary


Status:  Acute


(3) Tobacco abuse


ICD Code:  Z72.0 - Tobacco use


Diagnosis:  Secondary


Status:  Chronic


(4) NELLIE (acute kidney injury)


ICD Code:  N17.9 - Acute kidney failure, unspecified


Diagnosis:  Secondary


Status:  Acute


(5) Severe sepsis


ICD Code:  A41.9 - Sepsis, unspecified organism; R65.20 - Severe sepsis without 

septic shock


Diagnosis:  Principal


Status:  Acute


(6) Opioid dependence


ICD Code:  F11.20 - Opioid dependence, uncomplicated


Diagnosis:  Secondary


Status:  Chronic


(7) Chronic pain


ICD Code:  G89.29 - Other chronic pain


Diagnosis:  Secondary


Status:  Chronic


Procedures


Echocardiogram, diagnostic laparoscopic syncope with washout and drain placement

, procedure Date:


Mar 15, 2018


Pre Op Diagnosis:  


acute abdomen, lactic acidosis, colonic ischemia


Post Op Diagnosis:  


acute abdomen, ascities


Surgeon:


Diomedes lBanco MD


Assistant(s):


none


Procedure:


dx lap, with washout, drain placement


Findings:


ascities, viable colon


Complications:


none


Specimen(s) removed:


none


Estimated blood loss:


5cc


Anesthesia:  General


Drains:  ANDREI


Patient to:  PACU


Patient Condition:  Diomedes Bird MD


Brief History - From Admission


57 year-old  male who states he has had 3 days of left lower quadrant 

abdominal pain.  He has had some nausea and says he has had 3 episodes of 

vomiting, nonbloody nonbilious, over the course of the last 3 days.  He has 

also had 2-3 loose bowel movements over the course of the 3 days.  His friend, 

Janel, tells me that she found him in the house listless in bed with large 

amounts of dark stool on the floor of the bathroom.  He denies fever or chills.

  Workup in the ED demonstrated white count 28,000 with 20% bands.  Lactic acid 

is 6.5.  CT abdomen and pelvis demonstrates colitis  distal transverse colon to 

distal sigmoid, ischemic v.  infectious   He is normotensive with normal heart 

rate.  Patient is tender in the left lower quadrant.  He feels like his pain is 

slightly improved today compared to yesterday.  He states he has been eating 

intermittently throughout the course of this and has not noted increased pain 

with by mouth intake.  Denies history of atrial fibrillation though he is on 

Cardizem.  He attributes his symptoms to opioid withdrawal because he ran out 

of his oxycodone a few days ago and states he has been taking them for over 15 

years


CBC/BMP:  


3/20/18 0940                                                                   

             3/21/18 0542





Significant Findings





Laboratory Tests








Test


  3/20/18


09:40 3/21/18


05:42


 


Red Blood Count


  4.29 MIL/MM3


(4.50-5.90) 


 


 


Neutrophils (%) (Auto)


  70.7 %


(16.0-70.0) 


 


 


Monocytes (%) (Auto)


  14.7 %


(0.0-8.0) 


 


 


Monocytes # (Auto)


  1.6 TH/MM3


(0-0.9) 


 


 


Random Glucose


  115 MG/DL


() 


 


 


Potassium Level


  3.2 MEQ/L


(3.5-5.1) 


 


 


Chloride Level


  97 MEQ/L


() 


 


 


Calcium Level


  


  8.1 MG/DL


(8.5-10.1)








Imaging





Last Impressions








Upper Extremity Ultrasound 3/20/18 0000 Signed





Impressions: 





 Service Date/Time:  Tuesday, March 20, 2018 11:18 - CONCLUSION:  1. 

Nonocclusive 





 deep venous embolus is involving the left jugular vein. 2. Occlusive thrombus 

is 





 noted the left cephalic vein distally.     Johny Dwyer MD 


 


Neck Magnetic Resonance Angiography 3/18/18 0000 Signed





Impressions: 





 Service Date/Time:  Sunday, March 18, 2018 11:54 - CONCLUSION:  1. There is 





 focal high-grade stenosis in the proximal right common carotid just above its 





 origin from the innominate. This would be readily amenable to stent placement 





 for treatment. 2. Minimal stenosis at the origin of the right internal carotid 





 at the bifurcation. 3. Probable previous left carotid endarterectomy. 4. Mild 





 stenosis at the origin of the left vertebral.     Iván Pratt MD 


 


Head Magnetic Resonance Angiography 3/18/18 0000 Signed





Impressions: 





 Service Date/Time:  Sunday, March 18, 2018 11:54 - CONCLUSION: There is a 





 decrease caliber of the right anterior cerebral artery (A1 segment) extending 





 from the branch point of the right internal cerebral artery and middle artery, 





 however, the artery appears patent.     Ara Kerr MD 


 


Head CT 3/18/18 0000 Signed





Impressions: 





 Service Date/Time:  Sunday, March 18, 2018 08:35 - CONCLUSION: Focal areas of 





 encephalomalacia involving the right parietal lobe and right occipital lobe 





 consistent with old or subacute infarct. No evidence of adjacent edema. No 





 evidence of acute abnormality.     Ara Kerr MD 


 


Carotid Artery Ultrasound 3/18/18 0000 Signed





Impressions: 





 Service Date/Time:  Sunday, March 18, 2018 12:52 - CONCLUSION:  1. Diffuse 





 calcified atherosclerotic plaque with some degree of soft plaque present at 

the 





 carotid bifurcations. 2. No hemodynamically significant carotid stenosis 

evident 





 by velocity measurements. 3. Antegrade flow in both vertebral arteries.     





 Iván Pratt MD 


 


Brain MRI 3/18/18 0000 Signed





Impressions: 





 Service Date/Time:  Sunday, March 18, 2018 11:54 - CONCLUSION:  1. Patchy 

areas 





 of cortical infarct involving the right parietal and portions of the occipital 





 cortex. No evidence of hemorrhage within this. No significant mass effect is 





 identified.     Iván Pratt MD 


 


Abdomen/Pelvis CT 3/15/18 0129 Signed





Impressions: 





 Service Date/Time:  Thursday, March 15, 2018 02:14 - CONCLUSION:  1. Acute 





 inflammatory process extending from the distal transverse colon to the distal 





 sigmoid colon. No significant diverticuli. This could relate to ischemia 

colitis 





 or infectious colitis. 2. No abscess or free air. 3. Well-distended 

gallbladder 





 without calcified gallstones, gallbladder wall thickening, or pericholecystic 





 fluid.     Wil Blue Jr., MD 








PE at Discharge


GENERAL: Well-developed and well-nourished in no distress 


SKIN: Warm and dry.


CARDIOVASCULAR: Regular rate and rhythm.  S1 and S2


RESPIRATORY: No accessory muscle use. Clear to auscultation. Breath sounds 

equal bilaterally. 


GASTROINTESTINAL: Abdomen soft, mild right lower quadrant tenderness with drain 

in place, nondistended.


MUSCULOSKELETAL: Extremities without clubbing, cyanosis and edema. No obvious 

deformities. 


NEUROLOGICAL: Awake and alert. No obvious cranial nerve deficits.  Improving 

left-sided weakness with normal speech.


Hospital Course


Chronic pain


Insomnia


Oxycodone and IV Dilaudid as needed 


Acute CVA with left sided weakness.  Continue  aspirin and statin.  

Echocardiogram shows EF of 50%.  Risk factor modification, tobacco cessation.  

A1c 5.1.  Physical therapy, OT and speech therapy ff.  PT recommends inpatient 

rehab.  ST recommends  follow-up after the.  Neurology cleared patient for 

discharge to rehab. Rt carotid eval for cea/stent in a few weeks once he has 

healed from abdominal surgery and recovered from left hemiparesis.  He is 

refusing rehabilitation at this time and wants to go home to his elderly father'

s home.  Though he is improving but still weak and unable to support himself.  

He is a fall risk.  Consulted palliative care to clarify goals of care.  

Physical therapy has cleared patient for home discharge at this time will need 

assistance at all times





RESP:


Tobacco abuse


Wean oxygen, walk test.  Albuterol every 2 hours as needed for wheezing





CV: 


Stable


Prior medical records states he is on Cardizem.  Unclear patient was still 

taking this.  He states he is on an unknown antihypertensive medication.





GI:


Likely ischemic colitis discussed with general surgery status post diagnostic 

laparoscopy, washout and drain placement


Culture positive for Giardia.  Patient clinically stable with no more nausea, 

vomiting diarrhea


Advance diet per general surgery.  Wound care.  De-escalated antibiotic status 

post Flagyl





FEN/RENAL: 


Acute kidney injury.  Improved


Received 1 L NS in ED.  ml/hr will decrease to 70 cc an hour. 


Monitor I/O closely and follow-up BMP.  Discontinue Barber catheter 





ID:


Severe Sepsis 


Colitis - ?Ischemic


Leukocytosis.  Resolved


New onset fever.  Clinically stable with no recurrence.  Will monitor


Cultures negative to date except for Giardia.  De-escalate antibiotics as 

previously mentioned.  





Nonocclusive DVT involving the left jugular vein and occlusive thrombus in the 

left cephalic vein.  Warm compress, elevation and subcu heparin.





DVT prophylaxis with SCD and early ambulation.  Pharmacological prophylaxis 

with subcu heparin


Discharge Planning


Needs rehabilitation but patient is refusing.  He wants to be discharged with 

home care PT and visiting nurse.


Pt Condition on Discharge:  Stable


Discharge Disposition:  Disch w/ Home Health Serv


Discharge Time:  > 30 minutes


Discharge Instructions


DIET: Follow Instructions for:  Heart Healthy Diet


Activities you can perform:  Regular-No Restrictions


Activities to Avoid:  Driving


Follow up Referrals:  


Neurology - 1 Week


PCP Follow-up - 1 Week


Surgical - 1 Week





New Medications:  


Cane/Wood/Mens Standard (Cane/Wood/Mens Standard) 1 Mis Mis


EA .XX AS DIRECTED, #1





Oxygen (O2) (Oxygen (O2))  Device


LITER ELISE.CANULA CONTINUOUS for Prevent Hypoxemia, #2


Oxygen Concentrator Portable Gaseous


 2 L/min via Nasal Canula Continuous


 For 99 months


Aspirin (Tgt Aspirin) 81 Mg Chw


81 MG PO DAILY for Prevent Blood Clot, #30 EA





Varenicline (Chantix) 0.5 Mg Tab


0.5 MG PO Q12HR for tobacco, #20 TAB





[Heparin Inj] () 25090 UNITS/ML INJ


5000 UNITS SQ Q12HR for Prevent Blood Clot, #60 INJECTION





 


Continued Medications:  


Oxycodone (Roxicodone) 15 Mg Tab


15 MG PO Q4H PRN for PAIN, TAB 0 Refills





Pravastatin (Pravastatin) 20 Mg Tab


20 MG PO DAILY for Cholesterol Management, #30 TAB 0 Refills





Pregabalin (Lyrica) 75 Mg Cap


75 MG PO BID, #60 CAP 0 Refills

















Mendoza South MD Mar 22, 2018 15:09

## 2018-03-22 NOTE — HHI.PR
Subjective


Remarks


Follow-up CVA.  States he is getting better each day.  He still does not want 

rehab but has not been cleared by PT for home discharge as he is a fall risk.  

Discussed with nursing and palliative care





Objective


Vitals





Vital Signs








  Date Time  Temp Pulse Resp B/P (MAP) Pulse Ox O2 Delivery O2 Flow Rate FiO2


 


3/22/18 04:00 98.0 80 16 125/73 (90) 100   


 


3/22/18 00:00 97.6 112 20 151/78 (102) 99   


 


3/21/18 20:22     98 Nasal Cannula 2.00 


 


3/21/18 20:00      Nasal Cannula 2.00 


 


3/21/18 16:00 97.3 83 18 114/71 (85) 98   


 


3/21/18 12:00 98.5 94 18 163/83 (109) 96   


 


3/21/18 08:00     96 Nasal Cannula 2.00 21


 


3/21/18 08:00 98.8 94 18 170/87 (114) 96   














I/O      


 


 3/21/18 3/21/18 3/21/18 3/22/18 3/22/18 3/22/18





 07:00 15:00 23:00 07:00 15:00 23:00


 


Intake Total  1250 ml 480 ml   


 


Output Total 1050 ml  550 ml 70 ml  


 


Balance -1050 ml 1250 ml -70 ml -70 ml  


 


      


 


Intake Oral   480 ml   


 


IV Total  1250 ml    


 


Output Urine Total 1000 ml  350 ml   


 


Drainage Total 50 ml  200 ml 70 ml  


 


# Voids    100  


 


# Bowel Movements 2  1   








Result Diagram:  


3/20/18 0940                                                                   

             3/21/18 0542





Imaging





Last Impressions








Upper Extremity Ultrasound 3/20/18 0000 Signed





Impressions: 





 Service Date/Time:  Tuesday, March 20, 2018 11:18 - CONCLUSION:  1. 

Nonocclusive 





 deep venous embolus is involving the left jugular vein. 2. Occlusive thrombus 

is 





 noted the left cephalic vein distally.     Johny Dwyer MD 


 


Neck Magnetic Resonance Angiography 3/18/18 0000 Signed





Impressions: 





 Service Date/Time:  Sunday, March 18, 2018 11:54 - CONCLUSION:  1. There is 





 focal high-grade stenosis in the proximal right common carotid just above its 





 origin from the innominate. This would be readily amenable to stent placement 





 for treatment. 2. Minimal stenosis at the origin of the right internal carotid 





 at the bifurcation. 3. Probable previous left carotid endarterectomy. 4. Mild 





 stenosis at the origin of the left vertebral.     Iván Pratt MD 


 


Head Magnetic Resonance Angiography 3/18/18 0000 Signed





Impressions: 





 Service Date/Time:  Sunday, March 18, 2018 11:54 - CONCLUSION: There is a 





 decrease caliber of the right anterior cerebral artery (A1 segment) extending 





 from the branch point of the right internal cerebral artery and middle artery, 





 however, the artery appears patent.     Ara Kerr MD 


 


Head CT 3/18/18 0000 Signed





Impressions: 





 Service Date/Time:  Sunday, March 18, 2018 08:35 - CONCLUSION: Focal areas of 





 encephalomalacia involving the right parietal lobe and right occipital lobe 





 consistent with old or subacute infarct. No evidence of adjacent edema. No 





 evidence of acute abnormality.     Ara Kerr MD 


 


Carotid Artery Ultrasound 3/18/18 0000 Signed





Impressions: 





 Service Date/Time:  Sunday, March 18, 2018 12:52 - CONCLUSION:  1. Diffuse 





 calcified atherosclerotic plaque with some degree of soft plaque present at 

the 





 carotid bifurcations. 2. No hemodynamically significant carotid stenosis 

evident 





 by velocity measurements. 3. Antegrade flow in both vertebral arteries.     





 Iván Pratt MD 


 


Brain MRI 3/18/18 0000 Signed





Impressions: 





 Service Date/Time:  Sunday, March 18, 2018 11:54 - CONCLUSION:  1. Patchy 

areas 





 of cortical infarct involving the right parietal and portions of the occipital 





 cortex. No evidence of hemorrhage within this. No significant mass effect is 





 identified.     Iván Pratt MD 


 


Abdomen/Pelvis CT 3/15/18 0129 Signed





Impressions: 





 Service Date/Time:  Thursday, March 15, 2018 02:14 - CONCLUSION:  1. Acute 





 inflammatory process extending from the distal transverse colon to the distal 





 sigmoid colon. No significant diverticuli. This could relate to ischemia 

colitis 





 or infectious colitis. 2. No abscess or free air. 3. Well-distended 

gallbladder 





 without calcified gallstones, gallbladder wall thickening, or pericholecystic 





 fluid.     Wil Blue Jr., MD 








Objective Remarks


GENERAL: Well-developed and well-nourished in no distress 


SKIN: Warm and dry.


CARDIOVASCULAR: Regular rate and rhythm.  S1 and S2


RESPIRATORY: No accessory muscle use. Clear to auscultation. Breath sounds 

equal bilaterally. 


GASTROINTESTINAL: Abdomen soft, mild right lower quadrant tenderness with drain 

in place, nondistended.


MUSCULOSKELETAL: Extremities without clubbing, cyanosis and edema. No obvious 

deformities. 


NEUROLOGICAL: Awake and alert. No obvious cranial nerve deficits.  Improving 

left-sided weakness with normal speech.


Procedures


Echocardiogram, diagnostic laparoscopic syncope with washout and drain placement

, procedure Date:


Mar 15, 2018


Pre Op Diagnosis:  


acute abdomen, lactic acidosis, colonic ischemia


Post Op Diagnosis:  


acute abdomen, ascities


Surgeon:


Diomedes Blanco MD


Assistant(s):


none


Procedure:


dx lap, with washout, drain placement


Findings:


ascities, viable colon


Complications:


none


Specimen(s) removed:


none


Estimated blood loss:


5cc


Anesthesia:  General


Drains:  ANDREI


Patient to:  PACU


Patient Condition:  Diomedes iBrd MD





A/P


Problem List:  


(1) Colitis, acute


ICD Code:  K52.9 - Noninfective gastroenteritis and colitis, unspecified; 

R65.20 - Severe sepsis without septic shock


Status:  Acute


(2) Lactic acidosis


ICD Code:  E87.2 - Acidosis


Status:  Acute


(3) Tobacco abuse


ICD Code:  Z72.0 - Tobacco use


Status:  Chronic


(4) NELLIE (acute kidney injury)


ICD Code:  N17.9 - Acute kidney failure, unspecified


Status:  Acute


(5) Severe sepsis


ICD Code:  A41.9 - Sepsis, unspecified organism; R65.20 - Severe sepsis without 

septic shock


Status:  Acute


(6) Opioid dependence


ICD Code:  F11.20 - Opioid dependence, uncomplicated


Status:  Chronic


(7) Chronic pain


ICD Code:  G89.29 - Other chronic pain


Status:  Chronic


Assessment and Plan


NEURO:


Chronic pain


Insomnia


Oxycodone and IV Dilaudid as needed 


Acute CVA with left sided weakness.  Continue  aspirin and statin.  

Echocardiogram shows EF of 50%.  Risk factor modification, tobacco cessation.  

A1c 5.1.  Physical therapy, OT and speech therapy ff.  PT recommends inpatient 

rehab.  ST recommends  follow-up after the.  Neurology cleared patient for 

discharge to rehab. Rt carotid eval for cea/stent in a few weeks once he has 

healed from abdominal surgery and recovered from left hemiparesis.  He is 

refusing rehabilitation at this time and wants to go home to his elderly father'

s home.  Though he is improving but still weak and unable to support himself.  

He is a fall risk.  Consulted palliative care to clarify goals of care.  

Physical therapy has not cleared patient for home discharge at this time





RESP:


Tobacco abuse


Wean oxygen, walk test.  Albuterol every 2 hours as needed for wheezing





CV: 


Stable


Prior medical records states he is on Cardizem.  Unclear patient was still 

taking this.  He states he is on an unknown antihypertensive medication.





GI:


Likely ischemic colitis discussed with general surgery status post diagnostic 

laparoscopy, washout and drain placement


Culture positive for Giardia.  Patient clinically stable with no more nausea, 

vomiting diarrhea


Advance diet per general surgery.  Wound care.  De-escalated antibiotic status 

post Flagyl





FEN/RENAL: 


Acute kidney injury.  Improved


Received 1 L NS in ED.  ml/hr will decrease to 70 cc an hour. 


Monitor I/O closely and follow-up BMP.  Discontinue Barber catheter 





ID:


Severe Sepsis 


Colitis - ?Ischemic


Leukocytosis.  Resolved


New onset fever.  Clinically stable with no recurrence.  Will monitor


Cultures negative to date except for Giardia.  De-escalate antibiotics as 

previously mentioned.  





Nonocclusive DVT involving the left jugular vein and occlusive thrombus in the 

left cephalic vein.  Warm compress, elevation and subcu heparin.





DVT prophylaxis with SCD and early ambulation.  Pharmacological prophylaxis 

with subcu heparin


Discharge Planning


Needs rehabilitation but patient is refusing.  He wants to be discharged with 

home care PT and visiting nurse.  At this time, I do not feel patient is a safe 

discharge to home pending PT evaluation today. Requested walk test, home oxygen 

and C PT and VN just in case











Mendoza South MD Mar 22, 2018 07:59

## 2018-03-28 NOTE — PQ
Physician Query Response Document

 

 

PATIENT:               AGUS STARR

ACCT #:                  [id number]

MRN:                       [id number]

:                       1960

ADMIT DATE:       3/15/2018 4:29 AM

DISCH DATE:

RESPONDING

PROVIDER #:        [email or web address]

 

 

QUERY TEXT:

 

CDS Clarification

 

Opioid dependence in the setting of chronic pain treated with PO Oxycodone

Other explanation of clinical findings.

Unable to determine (no explanation for clinical findings).

 

 

The patient's Clinical Indicators include:

The medical record reflects the following clinical findings, treatment, and risk factors.

 

* Clinical Indicators: " He attributes his symptoms to opioid withdrawal because he ran out of his ox
ycodone a few days ago and states he has been taking them for over 15 years"

* Risk Factors: Chronic pain

* Treatment: PO Oxycodone

 

Please clarify and document your clinical opinion in the progress notes and discharge summary includi
ng the definitive and/or presumptive diagnosis (suspected or probable), related to the above clinical
 findings. Please include clinical findings supporting your diagnosis.

Thank you, Elayne Harris RN/CDS [phone]. [phone]

 

 

Query created by: Elayne Harris on 3/16/2018 12:16 PM

 

RESPONSE TEXT:

 

Provider disagreed with this CDI query.

Discussed with Elayne, has been addressed by Dr. South and I agree with his assessment

 

 

 

 

 

 

 

Electronically signed by:  Kristi Randle MD 3/28/2018 3:52 PM

## 2018-04-04 ENCOUNTER — HOSPITAL ENCOUNTER (OUTPATIENT)
Dept: HOSPITAL 17 - NEPE | Age: 58
Setting detail: OBSERVATION
LOS: 2 days | Discharge: HOME | End: 2018-04-06
Attending: HOSPITALIST | Admitting: HOSPITALIST
Payer: MEDICARE

## 2018-04-04 VITALS
RESPIRATION RATE: 14 BRPM | TEMPERATURE: 98 F | HEART RATE: 120 BPM | DIASTOLIC BLOOD PRESSURE: 66 MMHG | SYSTOLIC BLOOD PRESSURE: 123 MMHG | OXYGEN SATURATION: 98 %

## 2018-04-04 VITALS — HEIGHT: 68 IN | WEIGHT: 121.25 LBS | BODY MASS INDEX: 18.38 KG/M2

## 2018-04-04 VITALS
RESPIRATION RATE: 17 BRPM | HEART RATE: 100 BPM | SYSTOLIC BLOOD PRESSURE: 114 MMHG | TEMPERATURE: 97.8 F | DIASTOLIC BLOOD PRESSURE: 60 MMHG | OXYGEN SATURATION: 98 %

## 2018-04-04 VITALS — OXYGEN SATURATION: 98 % | RESPIRATION RATE: 19 BRPM

## 2018-04-04 VITALS
OXYGEN SATURATION: 95 % | DIASTOLIC BLOOD PRESSURE: 55 MMHG | RESPIRATION RATE: 18 BRPM | SYSTOLIC BLOOD PRESSURE: 105 MMHG | TEMPERATURE: 97.4 F | HEART RATE: 94 BPM

## 2018-04-04 VITALS
TEMPERATURE: 98.3 F | SYSTOLIC BLOOD PRESSURE: 134 MMHG | DIASTOLIC BLOOD PRESSURE: 70 MMHG | RESPIRATION RATE: 18 BRPM | HEART RATE: 107 BPM | OXYGEN SATURATION: 92 %

## 2018-04-04 VITALS — SYSTOLIC BLOOD PRESSURE: 89 MMHG | OXYGEN SATURATION: 98 % | HEART RATE: 79 BPM | DIASTOLIC BLOOD PRESSURE: 57 MMHG

## 2018-04-04 DIAGNOSIS — E44.0: ICD-10-CM

## 2018-04-04 DIAGNOSIS — K59.00: ICD-10-CM

## 2018-04-04 DIAGNOSIS — N32.89: ICD-10-CM

## 2018-04-04 DIAGNOSIS — E86.0: ICD-10-CM

## 2018-04-04 DIAGNOSIS — I69.354: ICD-10-CM

## 2018-04-04 DIAGNOSIS — G93.89: ICD-10-CM

## 2018-04-04 DIAGNOSIS — Z79.899: ICD-10-CM

## 2018-04-04 DIAGNOSIS — B96.20: ICD-10-CM

## 2018-04-04 DIAGNOSIS — Z87.891: ICD-10-CM

## 2018-04-04 DIAGNOSIS — I10: ICD-10-CM

## 2018-04-04 DIAGNOSIS — Z86.718: ICD-10-CM

## 2018-04-04 DIAGNOSIS — R10.31: Primary | ICD-10-CM

## 2018-04-04 DIAGNOSIS — N39.0: ICD-10-CM

## 2018-04-04 DIAGNOSIS — K56.7: ICD-10-CM

## 2018-04-04 DIAGNOSIS — F41.9: ICD-10-CM

## 2018-04-04 DIAGNOSIS — Z79.82: ICD-10-CM

## 2018-04-04 DIAGNOSIS — R11.2: ICD-10-CM

## 2018-04-04 DIAGNOSIS — R00.0: ICD-10-CM

## 2018-04-04 DIAGNOSIS — R63.0: ICD-10-CM

## 2018-04-04 DIAGNOSIS — R62.7: ICD-10-CM

## 2018-04-04 DIAGNOSIS — G89.29: ICD-10-CM

## 2018-04-04 DIAGNOSIS — R64: ICD-10-CM

## 2018-04-04 LAB
ALBUMIN SERPL-MCNC: 2 GM/DL (ref 3.4–5)
ALP SERPL-CCNC: 45 U/L (ref 45–117)
ALT SERPL-CCNC: 29 U/L (ref 12–78)
AST SERPL-CCNC: 75 U/L (ref 15–37)
BASOPHILS # BLD AUTO: 0 TH/MM3 (ref 0–0.2)
BASOPHILS NFR BLD: 0.3 % (ref 0–2)
BILIRUB SERPL-MCNC: 0.5 MG/DL (ref 0.2–1)
BUN SERPL-MCNC: 25 MG/DL (ref 7–18)
CALCIUM SERPL-MCNC: 8.3 MG/DL (ref 8.5–10.1)
CHLORIDE SERPL-SCNC: 102 MEQ/L (ref 98–107)
CREAT SERPL-MCNC: 1.1 MG/DL (ref 0.6–1.3)
EOSINOPHIL # BLD: 0 TH/MM3 (ref 0–0.4)
EOSINOPHIL NFR BLD: 0.5 % (ref 0–4)
ERYTHROCYTE [DISTWIDTH] IN BLOOD BY AUTOMATED COUNT: 14.2 % (ref 11.6–17.2)
GFR SERPLBLD BASED ON 1.73 SQ M-ARVRAT: 69 ML/MIN (ref 89–?)
GLUCOSE SERPL-MCNC: 104 MG/DL (ref 74–106)
HCO3 BLD-SCNC: 26.2 MEQ/L (ref 21–32)
HCT VFR BLD CALC: 34.7 % (ref 39–51)
HGB BLD-MCNC: 11.6 GM/DL (ref 13–17)
INR PPP: 1.1 RATIO
LYMPHOCYTES # BLD AUTO: 2.6 TH/MM3 (ref 1–4.8)
LYMPHOCYTES NFR BLD AUTO: 34 % (ref 9–44)
MCH RBC QN AUTO: 30.8 PG (ref 27–34)
MCHC RBC AUTO-ENTMCNC: 33.6 % (ref 32–36)
MCV RBC AUTO: 91.9 FL (ref 80–100)
MONOCYTE #: 1.7 TH/MM3 (ref 0–0.9)
MONOCYTES NFR BLD: 22.4 % (ref 0–8)
NEUTROPHILS # BLD AUTO: 3.3 TH/MM3 (ref 1.8–7.7)
NEUTROPHILS NFR BLD AUTO: 42.8 % (ref 16–70)
PLATELET # BLD: 327 TH/MM3 (ref 150–450)
PMV BLD AUTO: 8.8 FL (ref 7–11)
PROT SERPL-MCNC: 7.3 GM/DL (ref 6.4–8.2)
PROTHROMBIN TIME: 11.1 SEC (ref 9.8–11.6)
RBC # BLD AUTO: 3.78 MIL/MM3 (ref 4.5–5.9)
SODIUM SERPL-SCNC: 134 MEQ/L (ref 136–145)
TROPONIN I SERPL-MCNC: (no result) NG/ML (ref 0.02–0.05)
WBC # BLD AUTO: 7.7 TH/MM3 (ref 4–11)

## 2018-04-04 PROCEDURE — 70450 CT HEAD/BRAIN W/O DYE: CPT

## 2018-04-04 PROCEDURE — 71045 X-RAY EXAM CHEST 1 VIEW: CPT

## 2018-04-04 PROCEDURE — G0378 HOSPITAL OBSERVATION PER HR: HCPCS

## 2018-04-04 PROCEDURE — 97162 PT EVAL MOD COMPLEX 30 MIN: CPT

## 2018-04-04 PROCEDURE — 74176 CT ABD & PELVIS W/O CONTRAST: CPT

## 2018-04-04 PROCEDURE — 84100 ASSAY OF PHOSPHORUS: CPT

## 2018-04-04 PROCEDURE — 96361 HYDRATE IV INFUSION ADD-ON: CPT

## 2018-04-04 PROCEDURE — 81001 URINALYSIS AUTO W/SCOPE: CPT

## 2018-04-04 PROCEDURE — 93005 ELECTROCARDIOGRAM TRACING: CPT

## 2018-04-04 PROCEDURE — 85610 PROTHROMBIN TIME: CPT

## 2018-04-04 PROCEDURE — 96372 THER/PROPH/DIAG INJ SC/IM: CPT

## 2018-04-04 PROCEDURE — 84443 ASSAY THYROID STIM HORMONE: CPT

## 2018-04-04 PROCEDURE — 83880 ASSAY OF NATRIURETIC PEPTIDE: CPT

## 2018-04-04 PROCEDURE — 83690 ASSAY OF LIPASE: CPT

## 2018-04-04 PROCEDURE — 87186 SC STD MICRODIL/AGAR DIL: CPT

## 2018-04-04 PROCEDURE — 96360 HYDRATION IV INFUSION INIT: CPT

## 2018-04-04 PROCEDURE — 87040 BLOOD CULTURE FOR BACTERIA: CPT

## 2018-04-04 PROCEDURE — 85025 COMPLETE CBC W/AUTO DIFF WBC: CPT

## 2018-04-04 PROCEDURE — 84484 ASSAY OF TROPONIN QUANT: CPT

## 2018-04-04 PROCEDURE — 87493 C DIFF AMPLIFIED PROBE: CPT

## 2018-04-04 PROCEDURE — 80048 BASIC METABOLIC PNL TOTAL CA: CPT

## 2018-04-04 PROCEDURE — 97167 OT EVAL HIGH COMPLEX 60 MIN: CPT

## 2018-04-04 PROCEDURE — 99285 EMERGENCY DEPT VISIT HI MDM: CPT

## 2018-04-04 PROCEDURE — 87086 URINE CULTURE/COLONY COUNT: CPT

## 2018-04-04 PROCEDURE — 85730 THROMBOPLASTIN TIME PARTIAL: CPT

## 2018-04-04 PROCEDURE — 83735 ASSAY OF MAGNESIUM: CPT

## 2018-04-04 PROCEDURE — 80053 COMPREHEN METABOLIC PANEL: CPT

## 2018-04-04 PROCEDURE — 83605 ASSAY OF LACTIC ACID: CPT

## 2018-04-04 PROCEDURE — 87077 CULTURE AEROBIC IDENTIFY: CPT

## 2018-04-04 RX ADMIN — Medication SCH ML: at 20:26

## 2018-04-04 RX ADMIN — PREGABALIN SCH MG: 75 CAPSULE ORAL at 20:26

## 2018-04-04 RX ADMIN — AMITRIPTYLINE HYDROCHLORIDE SCH MG: 25 TABLET, FILM COATED ORAL at 20:26

## 2018-04-04 NOTE — RADRPT
EXAM DATE/TIME:  04/04/2018 11:45 

 

[facility] COMPARISON:     

MRI BRAIN W/O CONTRAST, March 18, 2018, 11:54.  CT BRAIN W/O CONTRAST, March 18, 2018, 8:35.

 

 

INDICATIONS :     

Altered mental status. 

                      

 

RADIATION DOSE:     

34.02 CTDIvol (mGy) 

 

 

 

MEDICAL HISTORY :     

Cerebrovascular disease. Hypertension. 

 

SURGICAL HISTORY :      

Appendectomy. 

 

ENCOUNTER:      

Initial

 

ACUITY:      

1 week

 

PAIN SCALE:      

Non-responsive

 

LOCATION:        

cranial 

 

TECHNIQUE:     

Multiple contiguous axial images were obtained of the head.  Using automated exposure control and adj
ustment of the mA and/or kV according to patient size, radiation dose was kept as low as reasonably a
chievable to obtain optimal diagnostic quality images.   DICOM format image data is available electro
nically for review and comparison.  

 

FINDINGS:     

 

CEREBRUM:     

Focal area of encephalomalacia in the right high convexity occipital region, shown by MR on 3/18/18 t
o represent acute infarction, is stable in size.  No new hypodensities seen.  There is good gray-whit
e matter differentiation.  No evidence of acute or chronic steroid no extra axial fluid.  The lateral
 ventricles are symmetric in size.

 

POSTERIOR FOSSA:     

The cerebellum and brainstem are intact.  The 4th ventricle is midline.  The cerebellopontine angle i
s unremarkable.

 

EXTRACRANIAL:     

The visualized portion of the orbits is intact.

 

SKULL:     

The calvaria is intact.  No evidence of skull fracture.

 

CONCLUSION:     

1. No acute findings in the brain.  No evidence of acute blood products.

2. Stable size of the encephalomalacia in the right parietal lobe at site of acute infarction imaged 
on 3/18/18.

 

 

 

 Wil Virk MD on April 04, 2018 at 12:04           

Board Certified Radiologist.

 This report was verified electronically.

## 2018-04-04 NOTE — RADRPT
EXAM DATE/TIME:  04/04/2018 11:33 

 

[facility] COMPARISON:     

No previous studies available for comparison.

 

                     

INDICATIONS :     

Evaluate for pneumonia.

                     

 

MEDICAL HISTORY :            

Hypertension. Melena. Anxiety. Tobacco use.   

 

SURGICAL HISTORY :        

Tonsillectomy. Appendectomy. Bilateral hip surgery. Back surgery.

 

ENCOUNTER:     

Initial                                        

 

ACUITY:     

1 day      

 

PAIN SCORE:     

0/10

 

LOCATION:     

Bilateral chest 

 

FINDINGS:     

A single view of the chest demonstrates the lungs to be symmetrically aerated without evidence of mas
s, infiltrate or effusion.  The cardiomediastinal contours are unremarkable.  Osseous structures are 
intact.

 

CONCLUSION:     The lungs are clear.

 

 

 

 Wil Virk MD on April 04, 2018 at 11:51           

Board Certified Radiologist.

 This report was verified electronically.

## 2018-04-04 NOTE — RADRPT
EXAM DATE/TIME:  04/04/2018 11:50 

 

[facility] COMPARISON:     

No previous studies available for comparison.

 

 

INDICATIONS :     

Generalized weakness. Abdominal pain. 

                  

 

ORAL CONTRAST:      

No oral contrast ingested.

                  

 

RADIATION DOSE:     

7.08 CTDIvol (mGy) 

 

 

MEDICAL HISTORY :     

Cerebrovascular disease. Hypertension. 

 

SURGICAL HISTORY :      

Appendectomy. 

 

ENCOUNTER:      

Initial

 

ACUITY:      

1 week

 

PAIN SCALE:      

3/10

 

LOCATION:       

Bilateral anterior 

 

TECHNIQUE:     

Volumetric scanning of the abdomen and pelvis was performed.  Using automated exposure control and ad
justment of the mA and/or kV according to patient size, radiation dose was kept as low as reasonably 
achievable to obtain optimal diagnostic quality images.  DICOM format image data is available electro
nically for review and comparison.  

 

FINDINGS:     

There is atelectasis of the lung bases. No acute findings in the liver, spleen, adrenals, kidneys pan
creas. No calcified gallstones.

 

There is a mild ileus. A small amount of air is present within the bladder. The bladder is distended.
 There is air in anterior abdominal wall which may be related to reported recent laparotomy.

 

Recent mural thickening of the left colon with surrounding inflammatory changes improved as March 15.
 Previous bilateral hip replacement.

 

CONCLUSION:     

1. Improvement in mural thickening and inflammatory changes around the left colon compared with March
 15.

2. Distended bladder with trace air within the bladder possibly from recent instrumentation.

3. Mild ileus. No obstruction or free air.

 

 

 

 Kenneth Davis MD on April 04, 2018 at 12:07           

Board Certified Radiologist.

 This report was verified electronically.

## 2018-04-04 NOTE — PD
HPI


Chief Complaint:  General Weakness


Time Seen by Provider:  11:02


Travel History


International Travel<30 days:  No


Contact w/Intl Traveler<30days:  No


Traveled to known affect area:  No





History of Present Illness


HPI


Patient has generalized weakness worse on his left part of his body, he is not 

able to take care of his activities of daily living, the patient was recently 

discharged on although rehab was recommended the patient declined that and 

wanted to go home with his friend.  His friend decided to accept on the 

challenge and take him home and give it a try.  His friend brings him back 

today because he is not able to take care of him, the patient is unable to take 

care of his activities of daily living, is not eating well.








Stated allergy to codeine


Past medical history significant for tonsillectomy, CVA, hypertension, 

appendectomy, anxiety, orthopedic surgery including bilateral hip surgery back 

surgery for which the patient is on chronic opioids





PFSH


Past Medical History


Anxiety:  Yes


Cardiovascular Problems:  Yes


Cerebrovascular Accident:  Yes


Diminished Hearing:  No


Gastrointestinal Disorders:  Yes (ABD PAIN, N/V)


Hypertension:  Yes


Neurologic:  Yes (stroke)


Respiratory:  Yes


Tetanus Vaccination:  > 5 Years


Influenza Vaccination:  Yes





Past Surgical History


Abdominal Surgery:  Yes (APPENDECTOMY )


Appendectomy:  Yes


Joint Replacement:  Yes (BILATERAL HIPS.)


Tonsillectomy:  Yes


Other Surgery:  Yes





Social History


Alcohol Use:  No


Tobacco Use:  No (quit 3 weeks ago)


Substance Use:  No





Allergies-Medications


(Allergen,Severity, Reaction):  


Coded Allergies:  


     codeine (Verified  Allergy, Severe, hives, 4/4/18)


Reported Meds & Prescriptions





Reported Meds & Active Scripts


Active


Tgt Aspirin (Aspirin) 81 Mg Chw 81 Mg PO DAILY


[Heparin Inj] 65506 UNITS/ML Inj 5,000 Units SQ Q12HR


Reported


Cardizem (Diltiazem HCl) 120 Mg Tab 120 Mg PO DAILY


Lyrica (Pregabalin) 75 Mg Cap 75 Mg PO BID


Gemfibrozil 600 Mg Tab 600 Mg PO BIDAC


     Take 30 minutes prior to breakfast and dinner.


Amitriptyline (Amitriptyline HCl) 25 Mg Tab 25 Mg PO HS


Pravastatin 20 Mg Tab 20 Mg PO DAILY


Valium (Diazepam) 10 Mg Tab 10 Mg PO BID PRN


Roxicodone (Oxycodone HCl) 15 Mg Tab 15 Mg PO Q4H PRN








Review of Systems


Except as stated in HPI:  all other systems reviewed are Neg


General / Constitutional:  No: Fever


Eyes:  No: Visual changes


HENT:  No: Headaches


Cardiovascular:  No: Chest Pain or Discomfort


Respiratory:  No: Shortness of Breath


Gastrointestinal:  No: Abdominal Pain


Genitourinary:  No: Dysuria


Musculoskeletal:  No: Pain


Skin:  No Rash


Neurologic:  Positive: Weakness, Focal Abnormalities


Psychiatric:  No: Depression


Endocrine:  No: Polydipsia


Hematologic/Lymphatic:  No: Easy Bruising





Physical Exam


Narrative


GENERAL: 


SKIN: Warm and dry.


HEAD: Atraumatic. Normocephalic. 


EYES: Pupils equal and round. No scleral icterus. No injection or drainage. 


ENT: No nasal bleeding or discharge.  Mucous membranes pink and moist.


NECK: Trachea midline. No JVD. 


CARDIOVASCULAR: Regular rate and rhythm.  


RESPIRATORY: No accessory muscle use. Clear to auscultation. Breath sounds 

equal bilaterally. 


GASTROINTESTINAL: Abdomen soft, non-tender, nondistended. Hepatic and splenic 

margins not palpable. 


MUSCULOSKELETAL: Extremities without clubbing, cyanosis, or edema. No obvious 

deformities. 


NEUROLOGICAL: Awake and some lethargy   Motor grossly weak generally but 

increased weakness to left upper/lower extremities (this is not new, since 

discharge).  


PSYCHIATRIC: Appropriate mood and affect; insight and judgment normal.





Data


Data


Last Documented VS





Vital Signs








  Date Time  Temp Pulse Resp B/P (MAP) Pulse Ox O2 Delivery O2 Flow Rate FiO2


 


4/4/18 12:57 97.8 100 17 114/60 (78) 98 Room Air  








Orders





 Orders


Complete Blood Count With Diff (4/4/18 11:15)


Comprehensive Metabolic Panel (4/4/18 11:15)


Troponin I (4/4/18 11:15)


B-Type Natriuretic Peptide (4/4/18 11:15)


Prothrombin Time / Inr (Pt) (4/4/18 11:15)


Act Partial Throm Time (Ptt) (4/4/18 11:15)


Lipase (4/4/18 11:15)


Urinalysis - C+S If Indicated (4/4/18 11:15)


Thyroid Stimulating Hormone (4/4/18 11:15)


Chest, Single Ap (4/4/18 11:15)


Ct Brain W/O Iv Contrast(Rout) (4/4/18 11:15)


Ct Abd/Pel W/O Iv Contrast (4/4/18 11:15)


Iv Access Insert/Monitor (4/4/18 11:15)


Ecg Monitoring (4/4/18 11:15)


Oximetry (4/4/18 11:15)


Lactic Acid Sepsis Protocol (4/4/18 11:45)


Blood Culture (4/4/18 11:45)


Sodium Chlor 0.9% 1000 Ml Inj (Ns 1000 M (4/4/18 12:00)


Sodium Chlor 0.9% 1000 Ml Inj (Ns 1000 M (4/4/18 12:00)


Place In Observation (4/4/18 )


Vital Signs (Adult) Q4H (4/4/18 14:22)


Activity Oob With Assistance (4/4/18 14:22)


Sodium Chloride 0.9% Flush (Ns Flush) (4/4/18 14:30)


Sodium Chloride 0.9% Flush (Ns Flush) (4/4/18 21:00)


Comprehensive Metabolic Panel (4/5/18 06:00)


Complete Blood Count With Diff (4/5/18 06:00)


Pt Request For Service (4/4/18 14:22)


Ot Request For Service (4/4/18 14:22)


Case Management Consult (4/4/18 14:22)


Scd Bilateral/Knee High BHARATI.BID (4/4/18 14:22)


Naloxone Inj (Narcan Inj) (4/4/18 14:30)


Magnesium Hydroxide Liq (Milk Of Magnesi (4/4/18 14:30)


Sennosides (Senokot) (4/4/18 14:30)


Bisacodyl Supp (Dulcolax Supp) (4/4/18 14:30)


Lactulose Liq (Lactulose Liq) (4/4/18 14:30)


Admit Order (Ed Use Only) (4/4/18 14:25)





Labs





Laboratory Tests








Test


  4/4/18


11:25 4/4/18


11:45


 


White Blood Count 7.7 TH/MM3  


 


Red Blood Count 3.78 MIL/MM3  


 


Hemoglobin 11.6 GM/DL  


 


Hematocrit 34.7 %  


 


Mean Corpuscular Volume 91.9 FL  


 


Mean Corpuscular Hemoglobin 30.8 PG  


 


Mean Corpuscular Hemoglobin


Concent 33.6 % 


  


 


 


Red Cell Distribution Width 14.2 %  


 


Platelet Count 327 TH/MM3  


 


Mean Platelet Volume 8.8 FL  


 


Neutrophils (%) (Auto) 42.8 %  


 


Lymphocytes (%) (Auto) 34.0 %  


 


Monocytes (%) (Auto) 22.4 %  


 


Eosinophils (%) (Auto) 0.5 %  


 


Basophils (%) (Auto) 0.3 %  


 


Neutrophils # (Auto) 3.3 TH/MM3  


 


Lymphocytes # (Auto) 2.6 TH/MM3  


 


Monocytes # (Auto) 1.7 TH/MM3  


 


Eosinophils # (Auto) 0.0 TH/MM3  


 


Basophils # (Auto) 0.0 TH/MM3  


 


CBC Comment DIFF FINAL  


 


Differential Comment   


 


Prothrombin Time 11.1 SEC  


 


Prothromb Time International


Ratio 1.1 RATIO 


  


 


 


Activated Partial


Thromboplast Time 23.8 SEC 


  


 


 


Blood Urea Nitrogen 25 MG/DL  


 


Creatinine 1.10 MG/DL  


 


Random Glucose 104 MG/DL  


 


Total Protein 7.3 GM/DL  


 


Albumin 2.0 GM/DL  


 


Calcium Level 8.3 MG/DL  


 


Alkaline Phosphatase 45 U/L  


 


Aspartate Amino Transf


(AST/SGOT) 75 U/L 


  


 


 


Alanine Aminotransferase


(ALT/SGPT) 29 U/L 


  


 


 


Total Bilirubin 0.5 MG/DL  


 


Sodium Level 134 MEQ/L  


 


Potassium Level 5.4 MEQ/L  


 


Chloride Level 102 MEQ/L  


 


Carbon Dioxide Level 26.2 MEQ/L  


 


Anion Gap 6 MEQ/L  


 


Estimat Glomerular Filtration


Rate 69 ML/MIN 


  


 


 


Troponin I


  LESS THAN 0.02


NG/ML 


 


 


B-Type Natriuretic Peptide 23 PG/ML  


 


Lipase 78 U/L  


 


Thyroid Stimulating Hormone


3rd Gen 1.470 uIU/ML 


  


 


 


Lactic Acid Level  1.1 mmol/L 











MDM


Medical Decision Making


Medical Screen Exam Complete:  Yes


Emergency Medical Condition:  Yes


Medical Record Reviewed:  Yes


Differential Diagnosis


Dehydration versus sepsis versus CVA


Narrative Course


On March 15, 2018 patient had a negative C. difficile negative HIV, UA is 

negative for UTI, regulation profile is within normal limits


March 15 CT read by radiologist as infectious colitis versus ischemic colitis 

extending from distal transverse colon to the distal sigmoid colon


CT head on March 18 shows focal areas of encephalomalacia involving the right 

parietal lobe and right occipital lobe consistent with old or subacute infarcts 

no adjacent edema and no evidence of acute abnormality.


MRI brain shows patchy areas of cortical infarct involving the right parietal 

and portions of the occipital cortex, without evidence of hemorrhage, no 

significant mass-effect is identified, this was read by radiologist on March 18 , 2018 March 20 ultrasound venous Doppler left performed and radiologist read it as: 

Ultrasound of upper extremity shows nonocclusive deep venous embolus involving 

the left jugular vein, occlusive thrombus noted in the left cephalic vein 

distally








CT head today as read by radiologist shows no acute findings in the brain, but 

a stable size of the encephalomalacia in the right parietal lobe at the site of 

acute infarction imaged on March 18


Chest x-ray read by radiologist as the lungs are clear


CT abdomen pelvis read by radiologist as improvement in mural thickening and 

inflammatory changes around the left colon compared with March 15, mild ileus, 

no obstruction or free air.


CBC shows no leukocytosis, no anemia, no left shift, normal platelet count


Coagulation profile is within normal limits


Electrolytes shows mild elevation of potassium of 5.4.  Mild low sodium of 134, 

BUN slightly elevated elevated at 25 with a creatinine of 1.1 and a GFR 69.  

Normal TSH, normal lipase and liver functions, and negative first set of 

cardiac enzymes, normal beta natruretic peptide of 23





Diagnosis





 Primary Impression:  


 DEHYDRATION 


 Additional Impression:  


 CVA (cerebral infarction)





Admitting Information


Admitting Physician Requests:  Observation











Trae Cook MD Apr 4, 2018 11:26

## 2018-04-04 NOTE — HHI.HP
__________________________________________________





HPI


Service


[facility]


Primary Care Physician


No Primary Care Physician


Admission Diagnosis





HYPOTENSION, DEHYDRATION


Diagnoses:  


Chief Complaint:  


poor oral intake, abdominal pain


Travel History


International Travel<30 Days:  No


Contact w/Intl Traveler <30 Da:  No


Traveled to Known Affected Are:  No


History of Present Illness


Written by Anna Crowley, acting as scribe for Dr. Horne on 18 at 14:37. 





57-year-old male with history of HTN, Chronic Pain, recent hospitalization 3/15-

3/22 for CVA with left sided weakness and severe sepsis ischemic colitis s/p 

laparoscopy with washout and drain placement, presents with ongoing abdominal 

pain, constipation, poor appetite, and difficulty with ADLs at home. The 

patient was initially discharged to the care of his father however now 

reportedly unable to care for himself at home and father unable to care for 

him. The patient is seen with his father at bedside. The patient is not the 

best historian and his father assists with the history. He is oriented to self, 

place, but does not know the month and states the year is . The patient 

reports he just got to a point where he wasn't hardly eating. His dad reports 

he has only been eating one breakfast shake a day. He denies any dysphagia, 

odynophagia, nausea, or vomiting. He reports occasional subjective fevers. He 

was having recurrent diarrhea after discharge, started taking imodium, then 

went over 1 week without any bowel movement, and only had 1 small bowl movement 

yesterday. He does take pain medication oxycodone for his right lower quadrant 

abdominal pain and right anterior groin pain he's had ever since his hip 

surgery many years ago. He reports intermittent sharp right lower quadrant pain 

with radiation to right groin and suprapubic area. He states the oxycodone 

helps relieve the pain. Denies any other medical complaints including no chest 

pain or shortness of breath. He does report some urinary hesitancy and poor 

urine output. He feels he is dehydrated. He has no other medical complaints at 

this time.





Review of Systems


Except as stated in HPI:  all other systems reviewed are Neg





Past Family Social History


Past Medical History


HTN


Chronic Pain


recent hospitalization 3/15-3/22 for CVA with left sided weakness and severe 

sepsis ischemic colitis s/p laparoscopy with washout and drain placement


DVT on Heparin sq


Past Surgical History


Laparoscopy with washout and drain placement


tonsillectomy


appendectomy


bilateral hip replacement


back surgery


Reported Medications





Reported Meds & Active Scripts


Active


Tgt Aspirin (Aspirin) 81 Mg Chw 81 Mg PO DAILY


[Heparin Inj] 85483 UNITS/ML Inj 5,000 Units SQ Q12HR


Reported


Cardizem (Diltiazem HCl) 120 Mg Tab 120 Mg PO DAILY


Lyrica (Pregabalin) 75 Mg Cap 75 Mg PO BID


Gemfibrozil 600 Mg Tab 600 Mg PO BIDAC


     Take 30 minutes prior to breakfast and dinner.


Amitriptyline (Amitriptyline HCl) 25 Mg Tab 25 Mg PO HS


Pravastatin 20 Mg Tab 20 Mg PO DAILY


Valium (Diazepam) 10 Mg Tab 10 Mg PO BID PRN


Roxicodone (Oxycodone HCl) 15 Mg Tab 15 Mg PO Q4H PRN


Allergies:  


Coded Allergies:  


     codeine (Verified  Allergy, Severe, hives, 18)


Active Ordered Medications





Current Medications








 Medications


  (Trade)  Dose


 Ordered  Sig/Daniel


 Route  Start Time


 Stop Time Status Last Admin


 


  (NS Flush)  2 ml  UNSCH  PRN


 IV FLUSH  18 14:30


     


 


 


  (NS Flush)  2 ml  BID


 IV FLUSH  18 21:00


     


 


 


  (Narcan Inj)  0.4 mg  UNSCH  PRN


 IV PUSH  18 14:30


     


 


 


  (Milk Of


 Magnesia Liq)  30 ml  Q12H  PRN


 PO  18 14:30


     


 


 


  (Senokot)  17.2 mg  Q12H  PRN


 PO  18 14:30


     


 


 


  (Dulcolax Supp)  10 mg  DAILY  PRN


 RECTAL  18 14:30


     


 


 


  (Lactulose Liq)  30 ml  DAILY  PRN


 PO  18 14:30


     


 








Family History


Obtained from EMR:


Father is living at age 89


Brother had an ocular tumor that was metastatic and  at age 43


Mother  of an unknown cancer in her late 70s


Social History


Quit smoking tobacco 2-3 weeks ago since he left the hospital in March, 

previously smokes 1PPD since childhood


Denies any alcohol or illicit drug use





Physical Exam


Vital Signs





Vital Signs








  Date Time  Temp Pulse Resp B/P (MAP) Pulse Ox O2 Delivery O2 Flow Rate FiO2


 


18 12:57 97.8 100 17 114/60 (78) 98 Room Air  


 


18 11:33  79  89/57 (68) 98 Room Air  


 


18 11:20  82 18  98 Room Air  


 


18 11:20   19  98 Room Air  


 


18 10:56 97.4 94 18 105/55 (72) 95   








Physical Exam


GENERAL: Well-nourished, well-developed thin cachectic appearing  male 

patient in NAD.


SKIN: Warm and dry.  


HEAD:  Normocephalic. Atraumatic. Bitemporal wasting.


EYES: Pupils equal and round. No scleral icterus. No injection or drainage. 


ENT: No nasal bleeding or discharge.  Mucous membranes pink and moist.


NECK: Supple. Trachea midline.  


CARDIOVASCULAR: Regular rate and rhythm.  S1, S2 noted. No murmur appreciated. 


RESPIRATORY: No accessory muscle use. Clear to auscultation. Breath sounds 

equal bilaterally.  


GASTROINTESTINAL: Abdomen soft, nondistended, diffuse lower abdominal TTP, 

worse at RLQ. Laparoscopic scars healing well.  Normoactive bowel sounds x4.


MUSCULOSKELETAL: No obvious deformities. Extremities without clubbing, cyanosis

, or edema. 


NEUROLOGICAL: Awake and alert. No obvious cranial nerve deficits.  Moving all 

extremities spontaneously. Residual LUE weakness.   Normal speech.


PSYCHIATRIC: Appropriate mood and affect; insight and judgment fair.


Laboratory





Laboratory Tests








Test


  18


11:25 18


11:45


 


White Blood Count 7.7  


 


Red Blood Count 3.78  


 


Hemoglobin 11.6  


 


Hematocrit 34.7  


 


Mean Corpuscular Volume 91.9  


 


Mean Corpuscular Hemoglobin 30.8  


 


Mean Corpuscular Hemoglobin


Concent 33.6 


  


 


 


Red Cell Distribution Width 14.2  


 


Platelet Count 327  


 


Mean Platelet Volume 8.8  


 


Neutrophils (%) (Auto) 42.8  


 


Lymphocytes (%) (Auto) 34.0  


 


Monocytes (%) (Auto) 22.4  


 


Eosinophils (%) (Auto) 0.5  


 


Basophils (%) (Auto) 0.3  


 


Neutrophils # (Auto) 3.3  


 


Lymphocytes # (Auto) 2.6  


 


Monocytes # (Auto) 1.7  


 


Eosinophils # (Auto) 0.0  


 


Basophils # (Auto) 0.0  


 


CBC Comment DIFF FINAL  


 


Differential Comment   


 


Prothrombin Time 11.1  


 


Prothromb Time International


Ratio 1.1 


  


 


 


Activated Partial


Thromboplast Time 23.8 


  


 


 


Blood Urea Nitrogen 25  


 


Creatinine 1.10  


 


Random Glucose 104  


 


Total Protein 7.3  


 


Albumin 2.0  


 


Calcium Level 8.3  


 


Alkaline Phosphatase 45  


 


Aspartate Amino Transf


(AST/SGOT) 75 


  


 


 


Alanine Aminotransferase


(ALT/SGPT) 29 


  


 


 


Total Bilirubin 0.5  


 


Sodium Level 134  


 


Potassium Level 5.4  


 


Chloride Level 102  


 


Carbon Dioxide Level 26.2  


 


Anion Gap 6  


 


Estimat Glomerular Filtration


Rate 69 


  


 


 


Troponin I LESS THAN 0.02  


 


B-Type Natriuretic Peptide 23  


 


Lipase 78  


 


Thyroid Stimulating Hormone


3rd Gen 1.470 


  


 


 


Lactic Acid Level  1.1 














 Date/Time


Source Procedure


Growth Status


 


 


 18 11:44


Blood Peripheral Aerobic Blood Culture


Pending Received


 


 18 11:44


Blood Peripheral Anaerobic Blood Culture


Pending Received








Result Diagram:  


18 1125                                                                    

            18 1125





Imaging





Last Impressions








Head CT 18 1115 Signed





Impressions: 





 Service Date/Time:  2018 11:45 - CONCLUSION:  1. No acute 





 findings in the brain.  No evidence of acute blood products. 2. Stable size of 





 the encephalomalacia in the right parietal lobe at site of acute infarction 





 imaged on 3/18/18.     Wil Virk MD 


 


Chest X-Ray 18 111 Signed





Impressions: 





 Service Date/Time:  2018 11:33 - CONCLUSION: The lungs are 





 clear.     Wil Virk MD 


 


Abdomen/Pelvis CT 18 Signed





Impressions: 





 Service Date/Time:  2018 11:50 - CONCLUSION:  1. 

Improvement 





 in mural thickening and inflammatory changes around the left colon compared 

with 





 March 15. 2. Distended bladder with trace air within the bladder possibly from 





 recent instrumentation. 3. Mild ileus. No obstruction or free air.     Kenneth Davis MD 











Caprini VTE Risk Assessment


Caprini VTE Risk Assessment:  Mod/High Risk (score >= 2)


Caprini Risk Assessment Model











 Point Value = 1          Point Value = 2  Point Value = 3  Point Value = 5


 


Age 41-60


Minor surgery


BMI > 25 kg/m2


Swollen legs


Varicose veins


Pregnancy or postpartum


History of unexplained or recurrent


   spontaneous 


Oral contraceptives or hormone


   replacement


Sepsis (< 1 month)


Serious lung disease, including


   pneumonia (< 1 month)


Abnormal pulmonary function


Acute myocardial infarction


Congestive heart failure (< 1 month)


History of inflammatory bowel disease


Medical patient at bed rest Age 61-74


Arthroscopic surgery


Major open surgery (> 45 min)


Laparoscopic surgery (> 45 min)


Malignancy


Confined to bed (> 72 hours)


Immobilizing plaster cast


Central venous access Age >= 75


History of VTE


Family history of VTE


Factor V Leiden


Prothrombin 13609F


Lupus anticoagulant


Anticardiolipin antibodies


Elevated serum homocysteine


Heparin-induced thrombocytopenia


Other congenital or acquired


   thrombophilia Stroke (< 1 month)


Elective arthroplasty


Hip, pelvis, or leg fracture


Acute spinal cord injury (< 1 month)








Prophylaxis Regimen











   Total Risk


Factor Score Risk Level Prophylaxis Regimen


 


0-1      Low Early ambulation


 


2 Moderate Order ONE of the following:


*Sequential Compression Device (SCD)


*Heparin 5000 units SQ BID


 


3-4 Higher Order ONE of the following medications:


*Heparin 5000 units SQ TID


*Enoxaparin/Lovenox 40 mg SQ daily (WT < 150 kg, CrCl > 30 mL/min)


*Enoxaparin/Lovenox 30 mg SQ daily (WT < 150 kg, CrCl > 10-29 mL/min)


*Enoxaparin/Lovenox 30 mg SQ BID (WT < 150 kg, CrCl > 30 mL/min)


AND/OR


*Sequential Compression Device (SCD)


 


5 or more Highest Order ONE of the following medications:


*Heparin 5000 units SQ TID (Preferred with Epidurals)


*Enoxaparin/Lovenox 40 mg SQ daily (WT < 150 kg, CrCl > 30 mL/min)


*Enoxaparin/Lovenox 30 mg SQ daily (WT < 150 kg, CrCl > 10-29 mL/min)


*Enoxaparin/Lovenox 30 mg SQ BID (WT < 150 kg, CrCl > 30 mL/min)


AND


*Sequential Compression Device (SCD)











Assessment and Plan


Assessment and Plan


57-year-old male with history of HTN, Chronic Pain, recent hospitalization 3/15-

3/22 for CVA with left sided weakness and severe sepsis ischemic colitis s/p 

laparoscopy with washout and drain placement, presents with ongoing abdominal 

pain, constipation, poor appetite, and difficulty with ADLs at home. 





Abdominal Pain/Nausea/Vomiting with Poor Oral Intake: suspect secondary to 

ileus in combination with previous diagnosis of ischemic colitis


   -CT abd/pelvis shows improvement in mural thickening and inflammatory 

changes around the left colon compared to 3/15/18; distended bladder with trace 

air within the bladder possibly from recent instrumentation; mild ileus; no 

obstruction or free air


   -Supportive treatment with IVF hydration, antiemetics prn


   -avoid opiates, likely contributing to ileus


   -monitor BMs





Failure to Thrive, Moderate Protein-Calorie Malnutrition, Cachexia: suspect 

multifactorial with recent hospitalization and failure to thrive


   -diet as tolerated, add Ensure shakes to meals


   -start on Marinol


   -Consult dietician


   -monitor oral intake


   -Consult PT/OT


   -Consult case management, likely needs placement





Urinary Retention: with Distended Bladder seen on CT abd/pelvis as above, rule 

out infection


   -obtain UA, straight cath if needed





Recent CVA: with residual left sided weakness


   -continue patient's aspirin, statin


   -monitor neuro checks





Recent DVT: EMR reviewed, LUE Doppler U/S on 3/20/18 showed nonocclusive deep 

venous embolus and involving the left jugular vein; occlusive thrombus in the 

left cephalic vein distally


   -continue on Heparin sq for now, will need to clarify anticoagulation





DVT Prophylaxis: heparin sq


Discussed Condition With


Patient, Patient's father, ER MD, Case management





Attending Statement


This note was transcribed by anam Crowley.  I, Dr. Devin Horne 

personally performed the history, physical exam, and medical decision making; 

and confirmed the accuracy of the information in the transcribed note.





Authenticated by Dr. Devin Horne on 18 at 09:26.











Anna Crowley PA-C 2018 14:41


Devin Horne MD 2018 09:26

## 2018-04-05 VITALS
SYSTOLIC BLOOD PRESSURE: 110 MMHG | OXYGEN SATURATION: 95 % | HEART RATE: 107 BPM | TEMPERATURE: 97 F | DIASTOLIC BLOOD PRESSURE: 57 MMHG | RESPIRATION RATE: 14 BRPM

## 2018-04-05 VITALS
HEART RATE: 97 BPM | DIASTOLIC BLOOD PRESSURE: 64 MMHG | OXYGEN SATURATION: 95 % | TEMPERATURE: 97.3 F | SYSTOLIC BLOOD PRESSURE: 111 MMHG | RESPIRATION RATE: 18 BRPM

## 2018-04-05 VITALS
DIASTOLIC BLOOD PRESSURE: 68 MMHG | HEART RATE: 106 BPM | RESPIRATION RATE: 18 BRPM | TEMPERATURE: 99.9 F | SYSTOLIC BLOOD PRESSURE: 154 MMHG | OXYGEN SATURATION: 93 %

## 2018-04-05 VITALS
OXYGEN SATURATION: 96 % | SYSTOLIC BLOOD PRESSURE: 100 MMHG | DIASTOLIC BLOOD PRESSURE: 72 MMHG | TEMPERATURE: 99.8 F | HEART RATE: 114 BPM | RESPIRATION RATE: 20 BRPM

## 2018-04-05 VITALS
HEART RATE: 101 BPM | DIASTOLIC BLOOD PRESSURE: 52 MMHG | OXYGEN SATURATION: 94 % | SYSTOLIC BLOOD PRESSURE: 95 MMHG | RESPIRATION RATE: 20 BRPM | TEMPERATURE: 98.7 F

## 2018-04-05 VITALS
HEART RATE: 97 BPM | OXYGEN SATURATION: 96 % | TEMPERATURE: 97.7 F | DIASTOLIC BLOOD PRESSURE: 67 MMHG | RESPIRATION RATE: 14 BRPM | SYSTOLIC BLOOD PRESSURE: 138 MMHG

## 2018-04-05 VITALS
SYSTOLIC BLOOD PRESSURE: 96 MMHG | OXYGEN SATURATION: 93 % | HEART RATE: 119 BPM | TEMPERATURE: 98.5 F | RESPIRATION RATE: 18 BRPM | DIASTOLIC BLOOD PRESSURE: 54 MMHG

## 2018-04-05 LAB
ALBUMIN SERPL-MCNC: 2.3 GM/DL (ref 3.4–5)
ALP SERPL-CCNC: 50 U/L (ref 45–117)
ALT SERPL-CCNC: 32 U/L (ref 12–78)
AST SERPL-CCNC: 50 U/L (ref 15–37)
BACTERIA #/AREA URNS HPF: (no result) /HPF
BASOPHILS # BLD AUTO: 0 TH/MM3 (ref 0–0.2)
BASOPHILS NFR BLD: 0.2 % (ref 0–2)
BILIRUB SERPL-MCNC: 0.5 MG/DL (ref 0.2–1)
BUN SERPL-MCNC: 13 MG/DL (ref 7–18)
CALCIUM SERPL-MCNC: 8.4 MG/DL (ref 8.5–10.1)
CHLORIDE SERPL-SCNC: 103 MEQ/L (ref 98–107)
COLOR UR: YELLOW
CREAT SERPL-MCNC: 0.72 MG/DL (ref 0.6–1.3)
EOSINOPHIL # BLD: 0 TH/MM3 (ref 0–0.4)
EOSINOPHIL NFR BLD: 0.2 % (ref 0–4)
ERYTHROCYTE [DISTWIDTH] IN BLOOD BY AUTOMATED COUNT: 14.1 % (ref 11.6–17.2)
GFR SERPLBLD BASED ON 1.73 SQ M-ARVRAT: 112 ML/MIN (ref 89–?)
GLUCOSE SERPL-MCNC: 90 MG/DL (ref 74–106)
GLUCOSE UR STRIP-MCNC: (no result) MG/DL
HCO3 BLD-SCNC: 26 MEQ/L (ref 21–32)
HCT VFR BLD CALC: 35.9 % (ref 39–51)
HGB BLD-MCNC: 12.1 GM/DL (ref 13–17)
HGB UR QL STRIP: (no result)
KETONES UR STRIP-MCNC: (no result) MG/DL
LYMPHOCYTES # BLD AUTO: 1.4 TH/MM3 (ref 1–4.8)
LYMPHOCYTES NFR BLD AUTO: 21.3 % (ref 9–44)
MCH RBC QN AUTO: 30.9 PG (ref 27–34)
MCHC RBC AUTO-ENTMCNC: 33.6 % (ref 32–36)
MCV RBC AUTO: 92.1 FL (ref 80–100)
MONOCYTE #: 1.3 TH/MM3 (ref 0–0.9)
MONOCYTES NFR BLD: 19.6 % (ref 0–8)
NEUTROPHILS # BLD AUTO: 3.9 TH/MM3 (ref 1.8–7.7)
NEUTROPHILS NFR BLD AUTO: 58.7 % (ref 16–70)
NITRITE UR QL STRIP: (no result)
PLATELET # BLD: 303 TH/MM3 (ref 150–450)
PMV BLD AUTO: 8.7 FL (ref 7–11)
PROT SERPL-MCNC: 7.6 GM/DL (ref 6.4–8.2)
RBC # BLD AUTO: 3.9 MIL/MM3 (ref 4.5–5.9)
SODIUM SERPL-SCNC: 136 MEQ/L (ref 136–145)
SP GR UR STRIP: 1.02 (ref 1–1.03)
URINE LEUKOCYTE ESTERASE: (no result)
WBC # BLD AUTO: 6.6 TH/MM3 (ref 4–11)

## 2018-04-05 RX ADMIN — ENOXAPARIN SODIUM SCH MG: 60 INJECTION SUBCUTANEOUS at 23:15

## 2018-04-05 RX ADMIN — ENOXAPARIN SODIUM SCH MG: 60 INJECTION SUBCUTANEOUS at 10:16

## 2018-04-05 RX ADMIN — AMITRIPTYLINE HYDROCHLORIDE SCH MG: 25 TABLET, FILM COATED ORAL at 23:14

## 2018-04-05 RX ADMIN — PHENYTOIN SODIUM SCH MLS/HR: 50 INJECTION INTRAMUSCULAR; INTRAVENOUS at 23:16

## 2018-04-05 RX ADMIN — DRONABINOL SCH MG: 2.5 CAPSULE ORAL at 18:03

## 2018-04-05 RX ADMIN — Medication SCH ML: at 23:16

## 2018-04-05 RX ADMIN — PREGABALIN SCH MG: 75 CAPSULE ORAL at 23:14

## 2018-04-05 RX ADMIN — GEMFIBROZIL SCH MG: 600 TABLET, FILM COATED ORAL at 05:37

## 2018-04-05 RX ADMIN — GEMFIBROZIL SCH MG: 600 TABLET, FILM COATED ORAL at 18:03

## 2018-04-05 RX ADMIN — DRONABINOL SCH MG: 2.5 CAPSULE ORAL at 10:14

## 2018-04-05 RX ADMIN — ASPIRIN 81 MG SCH MG: 81 TABLET ORAL at 10:14

## 2018-04-05 RX ADMIN — PRAVASTATIN SODIUM SCH MG: 20 TABLET ORAL at 10:14

## 2018-04-05 RX ADMIN — Medication SCH ML: at 10:13

## 2018-04-05 RX ADMIN — PREGABALIN SCH MG: 75 CAPSULE ORAL at 10:14

## 2018-04-05 NOTE — EKG
Date Performed: 2018       Time Performed: 08:19:01

 

PTAGE:      57 years

 

EKG:      Sinus rhythm 

 

 NORMAL ECG Since the 

 

 PREVIOUS TRACING            , no significant change noted PREVIOUS TRACIN/15/2018 07.33

 

DOCTOR:   Misti Albarado  Interpretating Date/Time  2018 13:59:30

## 2018-04-05 NOTE — HHI.PR
Subjective


Remarks


Follow up for abdominal pain, poor oral intake, failure to thrive, difficulty 

with ADLs. The patient is drowsy this morning, he states he's not a morning 

person. He states he has continued abdominal "soreness" but it continues to get 

better since his previous hospitalization. He denies any nausea/vomiting or 

diarrhea. He did have a bowel movement last night. Denies fevers/chills. He 

states he ate a good amount of his dinner last night. He agrees to go to rehab.





Objective


Vitals





Vital Signs








  Date Time  Temp Pulse Resp B/P (MAP) Pulse Ox O2 Delivery O2 Flow Rate FiO2


 


4/5/18 07:22 97.3 97 18 111/64 (80) 95   


 


4/5/18 04:39 97.7 97 14 138/67 (90) 96   


 


4/5/18 00:59 97.0 107 14 110/57 (74) 95   


 


4/4/18 21:26   18     


 


4/4/18 20:08 98.0 120 14 123/66 (85) 98   


 


4/4/18 16:30 98.3 107 18 134/70 (91) 92   


 


4/4/18 15:30        


 


4/4/18 12:57 97.8 100 17 114/60 (78) 98 Room Air  


 


4/4/18 11:33  79  89/57 (68) 98 Room Air  


 


4/4/18 11:20  82 18  98 Room Air  


 


4/4/18 11:20   19  98 Room Air  


 


4/4/18 10:56 97.4 94 18 105/55 (72) 95   














I/O      


 


 4/4/18 4/4/18 4/4/18 4/5/18 4/5/18 4/5/18





 07:00 15:00 23:00 07:00 15:00 23:00


 


Intake Total  2000 ml    


 


Balance  2000 ml    


 


      


 


Intake IV Total  2000 ml    


 


# Voids   1   


 


# Bowel Movements   1   








Result Diagram:  


4/4/18 1125                                                                    

            4/4/18 1125





Imaging





Last Impressions








Head CT 4/4/18 1115 Signed





Impressions: 





 Service Date/Time:  Wednesday, April 4, 2018 11:45 - CONCLUSION:  1. No acute 





 findings in the brain.  No evidence of acute blood products. 2. Stable size of 





 the encephalomalacia in the right parietal lobe at site of acute infarction 





 imaged on 3/18/18.     Wil Virk MD 


 


Chest X-Ray 4/4/18 1115 Signed





Impressions: 





 Service Date/Time:  Wednesday, April 4, 2018 11:33 - CONCLUSION: The lungs are 





 clear.     Wil Virk MD 


 


Abdomen/Pelvis CT 4/4/18 1115 Signed





Impressions: 





 Service Date/Time:  Wednesday, April 4, 2018 11:50 - CONCLUSION:  1. 

Improvement 





 in mural thickening and inflammatory changes around the left colon compared 

with 





 March 15. 2. Distended bladder with trace air within the bladder possibly from 





 recent instrumentation. 3. Mild ileus. No obstruction or free air.     Kenneth Davis MD 








Objective Remarks


GENERAL: Thin cachectic appearing middle aged male patient in [facility].


SKIN: Warm and dry. No rash.


HEENT:  Normocephalic. Atraumatic. Pupils equal and round.   Mucous membranes 

pink and moist.


CARDIOVASCULAR: Regular rate and rhythm.  No murmur appreciated. 


RESPIRATORY: No accessory muscle use. Clear to auscultation. Breath sounds 

equal bilaterally.  


GASTROINTESTINAL: Abdomen soft, non-tender, nondistended. Laparoscopic incision 

sites healing well, no surrounding erythema or drainage.  Normoactive bowel 

sounds x4.


MUSCULOSKELETAL: No obvious deformities. Extremities without clubbing, cyanosis

, or edema. 


NEUROLOGICAL: Awake and alert. No obvious cranial nerve deficits.  Motor 

grossly within normal limits. LUE 3/5 strength. Normal speech.


PSYCHIATRIC: Appropriate mood and affect; insight and judgment fair to limited.


Medications and IVs





Current Medications








 Medications


  (Trade)  Dose


 Ordered  Sig/Daniel


 Route  Start Time


 Stop Time Status Last Admin


 


  (NS Flush)  2 ml  UNSCH  PRN


 IV FLUSH  4/4/18 14:30


     


 


 


  (NS Flush)  2 ml  BID


 IV FLUSH  4/4/18 21:00


    4/4/18 20:26


 


 


  (Narcan Inj)  0.4 mg  UNSCH  PRN


 IV PUSH  4/4/18 14:30


     


 


 


  (Milk Of


 Magnesia Liq)  30 ml  Q12H  PRN


 PO  4/4/18 14:30


     


 


 


  (Senokot)  17.2 mg  Q12H  PRN


 PO  4/4/18 14:30


     


 


 


  (Dulcolax Supp)  10 mg  DAILY  PRN


 RECTAL  4/4/18 14:30


     


 


 


  (Lactulose Liq)  30 ml  DAILY  PRN


 PO  4/4/18 14:30


     


 


 


  (Zofran Inj)  4 mg  Q6H  PRN


 IV PUSH  4/4/18 17:30


     


 


 


  (Marinol)  2.5 mg  BID@11,16


 PO  4/5/18 11:00


     


 


 


  (Elavil)  25 mg  HS


 PO  4/4/18 21:00


    4/4/18 20:26


 


 


  (Aspirin Chew)  81 mg  DAILY


 PO  4/5/18 09:00


     


 


 


  (Lopid)  600 mg  BIDAC


 PO  4/5/18 07:00


    4/5/18 05:37


 


 


  (Pravachol)  20 mg  DAILY


 PO  4/5/18 09:00


     


 


 


  (Lyrica)  75 mg  BID


 PO  4/4/18 21:00


    4/4/18 20:26


 


 


  (Heparin Inj)  5,000 units  Q12HR


 SQ  4/4/18 21:00


    4/4/18 20:26


 











A/P


Assessment and Plan


57-year-old male with history of HTN, Chronic Pain, recent hospitalization 3/15-

3/22 for CVA with left sided weakness and severe sepsis ischemic colitis s/p 

laparoscopy with washout and drain placement, presents with ongoing abdominal 

pain, constipation, poor appetite, and difficulty with ADLs at home. 





Abdominal Pain/Nausea/Vomiting with Poor Oral Intake: suspect secondary to 

ileus in combination with previous diagnosis of ischemic colitis


   -CT abd/pelvis shows improvement in mural thickening and inflammatory 

changes around the left colon compared to 3/15/18; distended bladder with trace 

air within the bladder possibly from recent instrumentation; mild ileus; no 

obstruction or free air


   -Supportive treatment with IVF hydration, antiemetics prn


   -avoid opiates, likely contributing to ileus


   -monitor BMs, had a BM last night, improved, patient tolerating oral intake





Failure to Thrive, Moderate Protein-Calorie Malnutrition, Cachexia: suspect 

multifactorial with recent hospitalization and failure to thrive


   -diet as tolerated, add Ensure shakes to meals (patient only wants 

strawberry or vanilla, no chocolate)


   -start on Marinol for appetite stimulation


   -Consult dietician


   -monitor oral intake


   -Consult PT/OT


   -Consult case management, likely needs placement





Urinary Retention: with Distended Bladder seen on CT abd/pelvis as above, rule 

out infection


   -obtain UA, straight cath if needed





Recent CVA: with residual left sided weakness


   -continue patient's aspirin, statin


   -monitor neuro checks


   -PT consulted





Recent DVT: EMR reviewed, LUE Doppler U/S on 3/20/18 showed nonocclusive deep 

venous embolus and involving the left jugular vein; occlusive thrombus in the 

left cephalic vein distally


   -presented on sq heparin bid, however will change to full strength lovenox 

bid





Sinus Tachycardia: patient's pulse increased to 120 last night. 


   -EKG and telemetry ordered last night however this was discontinued by a 

nurse without permission


   -ordered EKG this morning, reviewed personally, showed NSR without any acute 

ischemic changes


   -tachycardia likely secondary to dehydration, improving s/p IVF





DVT Prophylaxis: Lovenox sq


Discharge Planning


Can likely discharge today after PT/OT evaluation and placement arranged.











Anna Crowley PA-C Apr 5, 2018 8:21 am

## 2018-04-06 VITALS
TEMPERATURE: 98.3 F | RESPIRATION RATE: 20 BRPM | OXYGEN SATURATION: 94 % | HEART RATE: 99 BPM | DIASTOLIC BLOOD PRESSURE: 69 MMHG | SYSTOLIC BLOOD PRESSURE: 123 MMHG

## 2018-04-06 VITALS
DIASTOLIC BLOOD PRESSURE: 54 MMHG | SYSTOLIC BLOOD PRESSURE: 107 MMHG | OXYGEN SATURATION: 96 % | HEART RATE: 88 BPM | RESPIRATION RATE: 20 BRPM | TEMPERATURE: 97.9 F

## 2018-04-06 VITALS
RESPIRATION RATE: 20 BRPM | OXYGEN SATURATION: 96 % | HEART RATE: 88 BPM | TEMPERATURE: 98 F | DIASTOLIC BLOOD PRESSURE: 54 MMHG | SYSTOLIC BLOOD PRESSURE: 107 MMHG

## 2018-04-06 VITALS — HEART RATE: 91 BPM

## 2018-04-06 LAB
BASOPHILS # BLD AUTO: 0 TH/MM3 (ref 0–0.2)
BASOPHILS NFR BLD: 0.3 % (ref 0–2)
BUN SERPL-MCNC: 13 MG/DL (ref 7–18)
CALCIUM SERPL-MCNC: 8.5 MG/DL (ref 8.5–10.1)
CHLORIDE SERPL-SCNC: 111 MEQ/L (ref 98–107)
CREAT SERPL-MCNC: 0.64 MG/DL (ref 0.6–1.3)
EOSINOPHIL # BLD: 0 TH/MM3 (ref 0–0.4)
EOSINOPHIL NFR BLD: 0.1 % (ref 0–4)
ERYTHROCYTE [DISTWIDTH] IN BLOOD BY AUTOMATED COUNT: 14.2 % (ref 11.6–17.2)
GFR SERPLBLD BASED ON 1.73 SQ M-ARVRAT: 128 ML/MIN (ref 89–?)
GLUCOSE SERPL-MCNC: 94 MG/DL (ref 74–106)
HCO3 BLD-SCNC: 24.3 MEQ/L (ref 21–32)
HCT VFR BLD CALC: 32.7 % (ref 39–51)
HGB BLD-MCNC: 10.9 GM/DL (ref 13–17)
LYMPHOCYTES # BLD AUTO: 1.4 TH/MM3 (ref 1–4.8)
LYMPHOCYTES NFR BLD AUTO: 16.6 % (ref 9–44)
MAGNESIUM SERPL-MCNC: 1.9 MG/DL (ref 1.5–2.5)
MCH RBC QN AUTO: 30.4 PG (ref 27–34)
MCHC RBC AUTO-ENTMCNC: 33.4 % (ref 32–36)
MCV RBC AUTO: 91 FL (ref 80–100)
MONOCYTE #: 1.2 TH/MM3 (ref 0–0.9)
MONOCYTES NFR BLD: 15 % (ref 0–8)
NEUTROPHILS # BLD AUTO: 5.7 TH/MM3 (ref 1.8–7.7)
NEUTROPHILS NFR BLD AUTO: 68 % (ref 16–70)
PHOSPHATE SERPL-MCNC: 2.6 MG/DL (ref 2.5–4.9)
PLATELET # BLD: 328 TH/MM3 (ref 150–450)
PMV BLD AUTO: 9.1 FL (ref 7–11)
RBC # BLD AUTO: 3.59 MIL/MM3 (ref 4.5–5.9)
SODIUM SERPL-SCNC: 145 MEQ/L (ref 136–145)
WBC # BLD AUTO: 8.3 TH/MM3 (ref 4–11)

## 2018-04-06 RX ADMIN — PRAVASTATIN SODIUM SCH MG: 20 TABLET ORAL at 09:14

## 2018-04-06 RX ADMIN — Medication SCH ML: at 09:14

## 2018-04-06 RX ADMIN — ENOXAPARIN SODIUM SCH MG: 60 INJECTION SUBCUTANEOUS at 09:14

## 2018-04-06 RX ADMIN — GEMFIBROZIL SCH MG: 600 TABLET, FILM COATED ORAL at 09:14

## 2018-04-06 RX ADMIN — GEMFIBROZIL SCH MG: 600 TABLET, FILM COATED ORAL at 15:49

## 2018-04-06 RX ADMIN — ASPIRIN 81 MG SCH MG: 81 TABLET ORAL at 09:15

## 2018-04-06 RX ADMIN — DRONABINOL SCH MG: 2.5 CAPSULE ORAL at 11:35

## 2018-04-06 RX ADMIN — PHENYTOIN SODIUM SCH MLS/HR: 50 INJECTION INTRAMUSCULAR; INTRAVENOUS at 06:47

## 2018-04-06 RX ADMIN — PREGABALIN SCH MG: 75 CAPSULE ORAL at 09:15

## 2018-04-06 NOTE — HHI.PR
Subjective


Remarks


Follow up for abdominal pain, poor oral intake, failure to thrive, difficulty 

with ADLs.   Patient is seen and examined.  Patient's abdominal pain is 

improved.  Denies any fever or chills.  Denies any chest pain or shortness of 

breath.  Denies any nausea or vomiting overnight.  Patient with one loose stool 

this am.  C. difficile testing was negative.





Objective


Vitals





Vital Signs








  Date Time  Temp Pulse Resp B/P (MAP) Pulse Ox O2 Delivery O2 Flow Rate FiO2


 


4/6/18 04:12 98.3 99 20 123/69 (87) 94   


 


4/6/18 00:18   18     


 


4/5/18 23:43 98.7 101 20 95/52 (66) 94   


 


4/5/18 21:05 99.8 114 20 100/72 (81) 96   


 


4/5/18 15:44 99.9 106 18 154/68 (96) 93   


 


4/5/18 11:43 98.5 119 18 96/54 (68) 93   








Result Diagram:  


4/5/18 0834                                                                    

            4/5/18 0834





Imaging





Last Impressions








Head CT 4/4/18 1115 Signed





Impressions: 





 Service Date/Time:  Wednesday, April 4, 2018 11:45 - CONCLUSION:  1. No acute 





 findings in the brain.  No evidence of acute blood products. 2. Stable size of 





 the encephalomalacia in the right parietal lobe at site of acute infarction 





 imaged on 3/18/18.     Wil Virk MD 


 


Chest X-Ray 4/4/18 1115 Signed





Impressions: 





 Service Date/Time:  Wednesday, April 4, 2018 11:33 - CONCLUSION: The lungs are 





 clear.     Wil Virk MD 


 


Abdomen/Pelvis CT 4/4/18 1115 Signed





Impressions: 





 Service Date/Time:  Wednesday, April 4, 2018 11:50 - CONCLUSION:  1. 

Improvement 





 in mural thickening and inflammatory changes around the left colon compared 

with 





 March 15. 2. Distended bladder with trace air within the bladder possibly from 





 recent instrumentation. 3. Mild ileus. No obstruction or free air.     Kenneth Davis MD 








Objective Remarks


GENERAL: Thin cachectic appearing middle aged male patient in [facility].  Awake and 

alert.


SKIN: Warm and dry. No rash.


HEENT:  Normocephalic. Atraumatic. Pupils equal and round.   Mucous membranes 

pink and moist.


CARDIOVASCULAR: Regular rate and rhythm.  No murmur appreciated. 


RESPIRATORY: Nonlabored. Clear to auscultation. Breath sounds equal 

bilaterally.  


GASTROINTESTINAL: Abdomen soft, non-tender, nondistended. Laparoscopic incision 

sites healing well, no surrounding erythema or drainage.  Normoactive bowel 

sounds x4.


MUSCULOSKELETAL: No obvious deformities. Extremities without clubbing, cyanosis

, or edema. 


NEUROLOGICAL: Awake and alert. No obvious cranial nerve deficits.  Motor 

grossly within normal limits. LUE 3/5 strength. Normal speech.


PSYCHIATRIC: Appropriate mood and affect; insight and judgment fair to limited.


Medications and IVs





Current Medications








 Medications


  (Trade)  Dose


 Ordered  Sig/Daniel


 Route  Start Time


 Stop Time Status Last Admin


 


  (NS Flush)  2 ml  UNSCH  PRN


 IV FLUSH  4/4/18 14:30


     


 


 


  (NS Flush)  2 ml  BID


 IV FLUSH  4/4/18 21:00


    4/6/18 09:14


 


 


  (Narcan Inj)  0.4 mg  UNSCH  PRN


 IV PUSH  4/4/18 14:30


     


 


 


  (Milk Of


 Magnesia Liq)  30 ml  Q12H  PRN


 PO  4/4/18 14:30


     


 


 


  (Senokot)  17.2 mg  Q12H  PRN


 PO  4/4/18 14:30


     


 


 


  (Dulcolax Supp)  10 mg  DAILY  PRN


 RECTAL  4/4/18 14:30


     


 


 


  (Lactulose Liq)  30 ml  DAILY  PRN


 PO  4/4/18 14:30


     


 


 


  (Zofran Inj)  4 mg  Q6H  PRN


 IV PUSH  4/4/18 17:30


     


 


 


  (Marinol)  2.5 mg  BID@11,16


 PO  4/5/18 11:00


    4/6/18 11:35


 


 


  (Elavil)  25 mg  HS


 PO  4/4/18 21:00


    4/5/18 23:14


 


 


  (Aspirin Chew)  81 mg  DAILY


 PO  4/5/18 09:00


    4/6/18 09:15


 


 


  (Lopid)  600 mg  BIDAC


 PO  4/5/18 07:00


    4/6/18 09:14


 


 


  (Pravachol)  20 mg  DAILY


 PO  4/5/18 09:00


    4/6/18 09:14


 


 


  (Lyrica)  75 mg  BID


 PO  4/4/18 21:00


    4/6/18 09:15


 


 


  (Lovenox Inj)  50 mg  Q12H


 SQ  4/5/18 09:30


    4/6/18 09:14


 


 


 Sodium Chloride  1,000 ml @ 


 84 mls/hr  [id number]


 IV  4/5/18 17:45


    4/6/18 06:47


 


 


  (Cipro)  500 mg  ONCE  ONCE


 PO  4/6/18 15:00


 4/6/18 15:01 UNV  


 


 


  (Cipro)  500 mg  Q12HR


 PO  4/6/18 21:00


   UNV  


 


 


  (Coumadin)  2 mg  ONCE  ONCE


 PO  4/6/18 15:15


 4/6/18 15:16 UNV  


 


 


  (Coumadin)  2 mg  DAILY@16


 PO  4/7/18 16:00


   UNV  


 











A/P


Assessment and Plan


57-year-old male with history of HTN, Chronic Pain, recent hospitalization 3/15-

3/22 for CVA with left sided weakness and severe sepsis ischemic colitis s/p 

laparoscopy with washout and drain placement, presents with ongoing abdominal 

pain, constipation, poor appetite, and difficulty with ADLs at home. 





Abdominal Pain/Nausea/Vomiting with Poor Oral Intake: suspect secondary to 

ileus in combination with previous diagnosis of ischemic colitis


   -CT abd/pelvis shows improvement in mural thickening and inflammatory 

changes around the left colon compared to 3/15/18; distended bladder with trace 

air within the bladder possibly from recent instrumentation; mild ileus; no 

obstruction or free air


   -Supportive treatment with IVF hydration, antiemetics prn


   -avoid opiates, likely contributing to ileus


   -monitor BMs, had a BM last night, improved, patient tolerating oral intake





Failure to Thrive, Moderate Protein-Calorie Malnutrition, Cachexia: suspect 

multifactorial with recent hospitalization and failure to thrive


   -diet as tolerated, add Ensure shakes to meals (patient only wants 

strawberry or vanilla, no chocolate)


   -start on Marinol for appetite stimulation, continue


   -Consult dietician


   -monitor oral intake


   -Consult PT/OT - continue at rehab


   -Case management assisted with rehab/SNF placement





Urinary Retention: with Distended Bladder seen on CT abd/pelvis as above, rule 

out infection


   -Patient urinating well now


   -UA positive for UTI


   -UCX growing gram negative rods


   -started on Cipro po





Recent CVA: with residual left sided weakness


   -continue patient's aspirin, statin


   -monitor neuro checks


   -PT consulted





Recent DVT: EMR reviewed, LUE Doppler U/S on 3/20/18 showed nonocclusive deep 

venous embolus and involving the left jugular vein; occlusive thrombus in the 

left cephalic vein distally


   -presented on sq heparin bid, however will change to full strength lovenox 

bid


   -Continue on Lovenox bridge to Coumadin.  Started on Coumadin 2 mg daily.  

Patient will need to have PT/INR rechecked tomorrow and warfarin dose adjusted 

accordingly.





Sinus Tachycardia: patient's pulse increased to 120 last night. 


   -EKG and telemetry ordered last night however this was discontinued by a 

nurse without permission


   -ordered EKG this morning, reviewed personally, showed NSR without any acute 

ischemic changes


   -tachycardia likely secondary to dehydration, improving s/p IVF





DVT Prophylaxis: Therapeutic Lovenox sq








Discharge patient to SNF


Condition on discharge: stable


Regular Diet as tolerated


Activity per PT/OT recommendations


Rx written:  Warfarin 2mg daily, Cipro 500mg BID x 5 days, Lovenox 50mg q12h


Follow-up with primary care physician











Margarita Woods Apr 6, 2018 07:53

## 2022-02-09 NOTE — PD.CONS
To front window with hx of chest pain x 9 days off and on. \"Fullness in neck\" during these episodes. Hx of anxiety. Smokes marijuana  Daily for anxiety. Lost approx 50 lbs over the last 6 months per pt statement without really trying. ekg performed in the urgent care . Lives in [place] but\"ins[place] is  [place] \". Has new pt appt with a family Dr in  [place] for next week. Dr Obregon Remed[facility] in to assess pt decision for transfer made. Pt left instable condition per self. All paperwork signed and in chart. cc:   Diomedes Blanco MD


__________________________________________________





HPI


Service


General Surgery


Consult Requested By


Dr. Randle


Reason for Consult


Ischemic colitis


Primary Care Physician


Keenan Tom MD


History of Present Illness


This is a 57 year old male with a past medical history of hypertension and 

fibromyalgia. The patient reports that he developed severe abdominal pain with 

associated nausea and vomiting that began about 12 hours prior to arrival to 

the ED.  He reports that he has not felt well for quite a few days and has not 

had much PO intake.  He denies fevers and chills.  He rates his pain as an 10/

10.  The patient reports on and off diarrhea for about 6 months.  On arrival a 

CT abdomen/pelvis was obtained which shows an inflammatory process extending 

from the distal transverse colon to the distal sigmoid colon.  He has an 

elevated WBC of 28 and a lactic acid of 6.5.  He has an elevated BUN at 31 and 

creatinine of 31 and 2.15 respectfully.  He was given four 1 L NS bolus and 

started on maintenance IVF.  He has received vancomycin, Flagyl and Zosyn. A 

General Surgery consultation has been requested.





Review of Systems


Constitutional:  COMPLAINS OF: Fatigue, Change in appetite


Endocrine:  DENIES: Polydipsia, Polyuria, Polyphagia


Eyes:  DENIES: Diplopia, Eye inflammation


Ears, nose, mouth, throat:  DENIES: Hearing loss


Respiratory:  DENIES: Cough, Shortness of breath


Cardiovascular:  DENIES: Chest pain


Gastrointestinal:  COMPLAINS OF: Abdominal pain, Black stools, Diarrhea, Nausea

, Vomiting


Genitourinary:  DENIES: Urinary frequency


Musculoskeletal:  DENIES: Joint pain


Integumentary:  DENIES: Abnormal pigmentation


Hematologic/lymphatic:  DENIES: Bruising


Immunologic/allergic:  DENIES: Eczema


Neurologic:  DENIES: Abnormal gait, Headache


Psychiatric:  DENIES: Confusion, Mood changes, Depression





Past Family Social History


Past Medical History


Hypertension


Fibromyalgia


Past Surgical History


Bilateral hip joint replacements


Tonsillectomy


Appendectomy


Reported Medications


Roxicodone 


Lyrica


Allergies:  


Coded Allergies:  


     codeine (Unverified  Allergy, Severe, 3/15/18)


Active Ordered Medications





Current Medications








 Medications


  (Trade)  Dose


 Ordered  Sig/Daniel


 Route  Start Time


 Stop Time Status Last Admin


 


 Lactated Ringer's  1,000 ml @ 


 200 mls/hr  Q5H


 IV  3/15/18 05:00


    3/15/18 06:17


 


 


 Fluconazole/


 Sodium Chloride  100 ml @ 


 100 mls/hr  Q24H


 IV  3/15/18 06:00


    3/15/18 06:23


 


 


 Pharmacy Profile


 Note  0 ml @ 0


 mls/hr  UNSCH


 OTHER  3/15/18 05:00


     


 


 


 Metronidazole  100 ml @ 


 100 mls/hr  Q8H


 IV  3/15/18 08:00


    3/15/18 07:42


 


 


 Cefepime HCl 2000


 mg/Sodium Chloride  100 ml @ 


 200 mls/hr  Q12H


 IV  3/15/18 12:00


    3/15/18 12:01


 


 


  (NS Flush)  2 ml  UNSCH  PRN


 IV FLUSH  3/15/18 06:00


     


 


 


  (NS Flush)  2 ml  BID


 IV FLUSH  3/15/18 09:00


    3/15/18 07:43


 


 


  (Tylenol)  650 mg  Q6H  PRN


 PO  3/15/18 06:00


     


 


 


  (Percocet  5-325


 Mg)  1 tab  Q4H  PRN


 PO  3/15/18 06:00


     


 


 


  (Dilaudid Pf Inj)  1 mg  Q4H  PRN


 IV PUSH  3/15/18 06:00


    3/15/18 08:52


 


 


  (Pepcid Inj)  20 mg  Q12HR


 IV PUSH  3/15/18 09:00


    3/15/18 07:43


 


 


  (Zofran Inj)  4 mg  Q6H  PRN


 IV PUSH  3/15/18 06:00


    3/15/18 08:51


 


 


  (Albuterol Neb)  2.5 mg  Q2HR NEB  PRN


 INH  3/15/18 06:00


     


 


 


 Miscellaneous


 Information  1  Q361D


 XX  3/15/18 06:00


     


 


 


  (Chlorhexidine


 2% Cloth)  3 pack


 Taper  DAILY@04


 TOP  3/16/18 04:00


 3/12/19 03:59   


 


 


  (Chlorhexidine


 2% Cloth)  3 pack  UNSCH  PRN


 TOP  3/15/18 06:00


     


 


 


  (Citroma Liq)  300 ml  ONCE  ONCE


 PO  3/15/18 16:00


 3/15/18 16:01   


 


 


  (Citroma Liq)  300 ml  ONCE  ONCE


 PO  3/15/18 18:00


 3/15/18 18:01   


 








Family History


Mother has lung cancer


Social History


+ tobacco use --- 1 ppd


Denies ETOH use 


Denies illicit drug use 





Lives alone.





Physical Exam


Vital Signs





Vital Signs








  Date Time  Temp Pulse Resp B/P (MAP) Pulse Ox O2 Delivery O2 Flow Rate FiO2


 


3/15/18 08:10 98.8 99 14 134/86 (102) 93   


 


3/15/18 08:10  99      


 


3/15/18 07:51     (104)    


 


3/15/18 07:35   16     


 


3/15/18 07:35 97.8 98 16 156/79 (104) 98 Room Air  


 


3/15/18 07:35     98 Room Air  


 


3/15/18 07:34   17  98 Room Air  


 


3/15/18 07:03  78 18 148/78 (101) 100   


 


3/15/18 07:00   16     


 


3/15/18 04:58     100 Room Air  


 


3/15/18 00:11   18     


 


3/15/18 00:09 97.6 110 18 131/74 (93) 98   








Physical Exam


GENERAL: 57 year old male resting in bed in mild acute distress due to pain. 


SKIN: Warm and dry.


HEAD: Atraumatic. Normocephalic. 


EYES: Pupils equal and round. No scleral icterus. No injection or drainage. 


ENT: No nasal bleeding or discharge.  Mucous membranes pink and moist.


NECK: Trachea midline. 


CARDIOVASCULAR: Regular rate and rhythm.  


RESPIRATORY: No accessory muscle use. Clear to auscultation. Breath sounds 

equal bilaterally. 


GASTROINTESTINAL: Abdomen distended; tenderness throughout; no large scars of 

hernias present. 


MUSCULOSKELETAL: Extremities without clubbing, cyanosis, or edema. No obvious 

deformities. 


NEUROLOGICAL: Awake and alert. No obvious cranial nerve deficits.  Motor 

grossly within normal limits. Five out of 5 muscle strength in the arms and 

legs.  Normal speech.


PSYCHIATRIC: Appropriate mood and affect; insight and judgment normal.


Laboratory





Laboratory Tests








Test


  3/15/18


00:18 3/15/18


01:31 3/15/18


04:44 3/15/18


04:54


 


White Blood Count 28.6    


 


Red Blood Count 5.26    


 


Hemoglobin 16.3    


 


Hematocrit 49.5    


 


Mean Corpuscular Volume 94.2    


 


Mean Corpuscular Hemoglobin 31.1    


 


Mean Corpuscular Hemoglobin


Concent 33.0 


  


  


  


 


 


Red Cell Distribution Width 14.1    


 


Platelet Count 226    


 


Mean Platelet Volume 8.5    


 


Neutrophils (%) (Auto) 84.8    


 


Lymphocytes (%) (Auto) 4.5    


 


Monocytes (%) (Auto) 10.4    


 


Eosinophils (%) (Auto) 0.1    


 


Basophils (%) (Auto) 0.2    


 


Neutrophils # (Auto) 24.2    


 


Lymphocytes # (Auto) 1.3    


 


Monocytes # (Auto) 3.0    


 


Eosinophils # (Auto) 0.0    


 


Basophils # (Auto) 0.1    


 


CBC Comment AUTO DIFF    


 


Differential Total Cells


Counted 100 


  


  


  


 


 


Neutrophils % (Manual) 49    


 


Band Neutrophils % 20    


 


Lymphocytes % 8    


 


Monocytes % 11    


 


Neutrophils # (Manual) 23.2    


 


Metamyelocytes 12    


 


Differential Comment


  FINAL DIFF


MANUAL 


  


  


 


 


Atypical Lymphocytes     


 


Toxic Vacuolation PRESENT    


 


Platelet Estimate NORMAL    


 


Platelet Morphology Comment NORMAL    


 


Red Cell Morphology Comment NORMAL    


 


Blood Urea Nitrogen 31    


 


Creatinine 2.15    


 


Random Glucose 102    


 


Total Protein 7.5    


 


Albumin 3.2    


 


Calcium Level 8.9    


 


Alkaline Phosphatase 69    


 


Aspartate Amino Transf


(AST/SGOT) 31 


  


  


  


 


 


Alanine Aminotransferase


(ALT/SGPT) 16 


  


  


  


 


 


Total Bilirubin 0.8    


 


Sodium Level 140    


 


Potassium Level 4.5    


 


Chloride Level 102    


 


Carbon Dioxide Level 23.5    


 


Anion Gap 15    


 


Estimat Glomerular Filtration


Rate 32 


  


  


  


 


 


Lipase 56  54   


 


Lactic Acid Level  6.5  3.2  


 


Prothrombin Time    12.5 


 


Prothromb Time International


Ratio 


  


  


  1.2 


 


 


Activated Partial


Thromboplast Time 


  


  


  31.0 


 


 


Test


  3/15/18


06:15 3/15/18


09:32 


  


 


 


Urine Color YELLOW    


 


Urine Turbidity CLEAR    


 


Urine pH 6.0    


 


Urine Specific Gravity 1.020    


 


Urine Protein TRACE    


 


Urine Glucose (UA) NEG    


 


Urine Ketones NEG    


 


Urine Occult Blood NEG    


 


Urine Nitrite NEG    


 


Urine Bilirubin NEG    


 


Urine Urobilinogen 2.0    


 


Urine Leukocyte Esterase TRACE    


 


Urine RBC 3    


 


Urine WBC 1    


 


Urine Squamous Epithelial


Cells 2 


  


  


  


 


 


Urine Bacteria RARE    


 


Microscopic Urinalysis Comment


  CULT NOT


INDICATED 


  


  


 


 


Blood Urea Nitrogen  31   


 


Creatinine  1.14   


 


Random Glucose  88   


 


Calcium Level  7.7   


 


Sodium Level  141   


 


Potassium Level  4.5   


 


Chloride Level  110   


 


Carbon Dioxide Level  24.3   


 


Anion Gap  7   


 


Estimat Glomerular Filtration


Rate 


  66 


  


  


 


 


Lactic Acid Level  2.3   


 


HIV (1&2) Ab and P24 Ag, 4th


Gener 


  NONREACTIVE 


  


  


 














 Date/Time


Source Procedure


Growth Status


 


 


 3/15/18 02:40


Blood Peripheral Aerobic Blood Culture


Pending Received


 


 3/15/18 02:40


Blood Peripheral Anaerobic Blood Culture


Pending Received








Result Diagram:  


3/20/18 0940                                                                   

             3/21/18 0542





Imaging





Last 48 hours Impressions








Abdomen/Pelvis CT 3/15/18 0129 Signed





Impressions: 





 Service Date/Time:  Thursday, March 15, 2018 02:14 - CONCLUSION:  1. Acute 





 inflammatory process extending from the distal transverse colon to the distal 





 sigmoid colon. No significant diverticuli. This could relate to ischemia 

colitis 





 or infectious colitis. 2. No abscess or free air. 3. Well-distended 

gallbladder 





 without calcified gallstones, gallbladder wall thickening, or pericholecystic 





 fluid.     Wil Blue Jr., MD 











Assessment and Plan


Assessment and Plan


57 year old male with abdominal pain; elevated WBC 





-Continue fluid resuscitation 


-Continue antibiotics


-Recheck WBC


-Lactic acid continues to trend down with fluid resuscitation


-GI following


-May consider taking patient OR if WBC does not show improvement


-Will continue to follow


-This was discussed with patient + father at bedside 


-Thank you for this consult


Discussed Condition With


Dr. Francis Mirza 


Mr. Quezada + Father at bedside





Attending Statement


patient seen at bedside


severe abdominal pain, elevated lactate, leukocytosis


ct concern for intestinal ischemia


discussed with patient for dx lap, possible ex lap surgical planning





Attestation


The exam, history, and the medical decision-making described in the above note 

were completed with the assistance of the mid-level provider. I reviewed and 

agree with the findings presented.  I attest that I had a face-to-face 

encounter with the patient on the same day, and personally performed and 

documented my assessment and findings in the medical record.











Yolanda Dennison/[facility] Assist ARNP Mar 15, 2018 12:20


Diomedes Blanco MD Mar 22, 2018 11:55